# Patient Record
Sex: MALE | Race: WHITE | Employment: OTHER | URBAN - METROPOLITAN AREA
[De-identification: names, ages, dates, MRNs, and addresses within clinical notes are randomized per-mention and may not be internally consistent; named-entity substitution may affect disease eponyms.]

---

## 2017-02-14 RX ORDER — INSULIN ASPART 100 [IU]/ML
INJECTION, SOLUTION INTRAVENOUS; SUBCUTANEOUS
Qty: 30 ML | Refills: 0 | Status: ON HOLD | OUTPATIENT
Start: 2017-02-14 | End: 2017-08-20 | Stop reason: HOSPADM

## 2017-03-10 ENCOUNTER — OFFICE VISIT (OUTPATIENT)
Dept: INTERNAL MEDICINE CLINIC | Age: 62
End: 2017-03-10

## 2017-03-10 VITALS
SYSTOLIC BLOOD PRESSURE: 130 MMHG | BODY MASS INDEX: 19.7 KG/M2 | DIASTOLIC BLOOD PRESSURE: 80 MMHG | WEIGHT: 130 LBS | HEART RATE: 70 BPM | HEIGHT: 68 IN | RESPIRATION RATE: 14 BRPM

## 2017-03-10 DIAGNOSIS — Z79.4 TYPE 2 DIABETES MELLITUS WITH HYPERGLYCEMIA, WITH LONG-TERM CURRENT USE OF INSULIN (HCC): ICD-10-CM

## 2017-03-10 DIAGNOSIS — E11.65 TYPE 2 DIABETES MELLITUS WITH HYPERGLYCEMIA, WITH LONG-TERM CURRENT USE OF INSULIN (HCC): ICD-10-CM

## 2017-03-10 DIAGNOSIS — R80.9 MICROALBUMINURIA DUE TO TYPE 2 DIABETES MELLITUS (HCC): ICD-10-CM

## 2017-03-10 DIAGNOSIS — Z79.4 TYPE 2 DIABETES MELLITUS WITH HYPERGLYCEMIA, WITH LONG-TERM CURRENT USE OF INSULIN (HCC): Primary | ICD-10-CM

## 2017-03-10 DIAGNOSIS — Z79.4 TYPE 2 DIABETES MELLITUS WITH MICROALBUMINURIA, WITH LONG-TERM CURRENT USE OF INSULIN (HCC): ICD-10-CM

## 2017-03-10 DIAGNOSIS — E11.29 TYPE 2 DIABETES MELLITUS WITH MICROALBUMINURIA, WITH LONG-TERM CURRENT USE OF INSULIN (HCC): ICD-10-CM

## 2017-03-10 DIAGNOSIS — K90.9 UNSPECIFIED INTESTINAL MALABSORPTION: ICD-10-CM

## 2017-03-10 DIAGNOSIS — R80.9 TYPE 2 DIABETES MELLITUS WITH MICROALBUMINURIA, WITH LONG-TERM CURRENT USE OF INSULIN (HCC): ICD-10-CM

## 2017-03-10 DIAGNOSIS — E11.65 TYPE 2 DIABETES MELLITUS WITH HYPERGLYCEMIA, WITH LONG-TERM CURRENT USE OF INSULIN (HCC): Primary | ICD-10-CM

## 2017-03-10 DIAGNOSIS — Z55.0 ILLITERATE: ICD-10-CM

## 2017-03-10 DIAGNOSIS — E78.5 HYPERLIPIDEMIA WITH TARGET LDL LESS THAN 100: ICD-10-CM

## 2017-03-10 DIAGNOSIS — I10 ESSENTIAL HYPERTENSION: ICD-10-CM

## 2017-03-10 DIAGNOSIS — E11.29 MICROALBUMINURIA DUE TO TYPE 2 DIABETES MELLITUS (HCC): ICD-10-CM

## 2017-03-10 DIAGNOSIS — D50.0 IRON DEFICIENCY ANEMIA DUE TO CHRONIC BLOOD LOSS: ICD-10-CM

## 2017-03-10 LAB
A/G RATIO: 0.6 (ref 1.1–2.2)
ALBUMIN SERPL-MCNC: 3 G/DL (ref 3.4–5)
ALP BLD-CCNC: 135 U/L (ref 40–129)
ALT SERPL-CCNC: 14 U/L (ref 10–40)
ANION GAP SERPL CALCULATED.3IONS-SCNC: 16 MMOL/L (ref 3–16)
AST SERPL-CCNC: 19 U/L (ref 15–37)
BILIRUB SERPL-MCNC: <0.2 MG/DL (ref 0–1)
BUN BLDV-MCNC: 12 MG/DL (ref 7–20)
CALCIUM SERPL-MCNC: 9.7 MG/DL (ref 8.3–10.6)
CHLORIDE BLD-SCNC: 94 MMOL/L (ref 99–110)
CO2: 26 MMOL/L (ref 21–32)
CREAT SERPL-MCNC: 0.7 MG/DL (ref 0.8–1.3)
GFR AFRICAN AMERICAN: >60
GFR NON-AFRICAN AMERICAN: >60
GLOBULIN: 5 G/DL
GLUCOSE BLD-MCNC: 227 MG/DL (ref 70–99)
POTASSIUM SERPL-SCNC: 5.1 MMOL/L (ref 3.5–5.1)
SODIUM BLD-SCNC: 136 MMOL/L (ref 136–145)
TOTAL PROTEIN: 8 G/DL (ref 6.4–8.2)

## 2017-03-10 PROCEDURE — G8484 FLU IMMUNIZE NO ADMIN: HCPCS | Performed by: INTERNAL MEDICINE

## 2017-03-10 PROCEDURE — 3017F COLORECTAL CA SCREEN DOC REV: CPT | Performed by: INTERNAL MEDICINE

## 2017-03-10 PROCEDURE — G8420 CALC BMI NORM PARAMETERS: HCPCS | Performed by: INTERNAL MEDICINE

## 2017-03-10 PROCEDURE — 3046F HEMOGLOBIN A1C LEVEL >9.0%: CPT | Performed by: INTERNAL MEDICINE

## 2017-03-10 PROCEDURE — G8427 DOCREV CUR MEDS BY ELIG CLIN: HCPCS | Performed by: INTERNAL MEDICINE

## 2017-03-10 PROCEDURE — 99214 OFFICE O/P EST MOD 30 MIN: CPT | Performed by: INTERNAL MEDICINE

## 2017-03-10 PROCEDURE — 1036F TOBACCO NON-USER: CPT | Performed by: INTERNAL MEDICINE

## 2017-03-10 SDOH — EDUCATIONAL SECURITY - EDUCATION ATTAINMENT: ILITERACY AND LOW LEVEL LITERACY: Z55.0

## 2017-03-10 ASSESSMENT — ENCOUNTER SYMPTOMS
WHEEZING: 0
ABDOMINAL PAIN: 0
NAUSEA: 0
RHINORRHEA: 0
SHORTNESS OF BREATH: 0
VOMITING: 0
BACK PAIN: 0

## 2017-03-11 LAB
ESTIMATED AVERAGE GLUCOSE: 251.8 MG/DL
HBA1C MFR BLD: 10.4 %

## 2017-05-08 ENCOUNTER — TELEPHONE (OUTPATIENT)
Dept: FAMILY MEDICINE CLINIC | Age: 62
End: 2017-05-08

## 2017-06-03 PROBLEM — C64.2 RENAL CELL CARCINOMA OF LEFT KIDNEY (HCC): Status: ACTIVE | Noted: 2017-06-03

## 2017-06-07 ENCOUNTER — HOSPITAL ENCOUNTER (OUTPATIENT)
Dept: CT IMAGING | Age: 62
Discharge: HOME | End: 2017-06-07
Attending: UROLOGY | Admitting: UROLOGY

## 2017-06-07 DIAGNOSIS — N28.9 DISORDER OF KIDNEY AND URETER: ICD-10-CM

## 2017-06-07 DIAGNOSIS — N28.9 UNSPECIFIED DISORDER OF KIDNEY AND URETER: ICD-10-CM

## 2017-06-08 ENCOUNTER — OFFICE VISIT (OUTPATIENT)
Dept: INTERNAL MEDICINE CLINIC | Age: 62
End: 2017-06-08

## 2017-06-08 VITALS
DIASTOLIC BLOOD PRESSURE: 70 MMHG | RESPIRATION RATE: 14 BRPM | HEART RATE: 102 BPM | WEIGHT: 117 LBS | SYSTOLIC BLOOD PRESSURE: 126 MMHG | HEIGHT: 68 IN | BODY MASS INDEX: 17.73 KG/M2

## 2017-06-08 DIAGNOSIS — D50.9 IRON DEFICIENCY ANEMIA, UNSPECIFIED IRON DEFICIENCY ANEMIA TYPE: ICD-10-CM

## 2017-06-08 DIAGNOSIS — N28.89 LEFT RENAL MASS: Primary | ICD-10-CM

## 2017-06-08 PROCEDURE — 1036F TOBACCO NON-USER: CPT | Performed by: NURSE PRACTITIONER

## 2017-06-08 PROCEDURE — 99214 OFFICE O/P EST MOD 30 MIN: CPT | Performed by: NURSE PRACTITIONER

## 2017-06-08 PROCEDURE — 3017F COLORECTAL CA SCREEN DOC REV: CPT | Performed by: NURSE PRACTITIONER

## 2017-06-08 PROCEDURE — G8427 DOCREV CUR MEDS BY ELIG CLIN: HCPCS | Performed by: NURSE PRACTITIONER

## 2017-06-08 PROCEDURE — G8419 CALC BMI OUT NRM PARAM NOF/U: HCPCS | Performed by: NURSE PRACTITIONER

## 2017-06-08 ASSESSMENT — ENCOUNTER SYMPTOMS
ABDOMINAL PAIN: 0
NAUSEA: 0
DIARRHEA: 0
COUGH: 0
VOMITING: 0
BACK PAIN: 1
SHORTNESS OF BREATH: 0

## 2017-06-15 ENCOUNTER — HOSPITAL ENCOUNTER (OUTPATIENT)
Dept: OTHER | Age: 62
Discharge: HOME | End: 2017-06-15
Attending: UROLOGY | Admitting: UROLOGY

## 2017-06-15 PROBLEM — D63.8 ANEMIA OF CHRONIC DISEASE: Status: ACTIVE | Noted: 2017-06-15

## 2017-06-15 LAB
APTT: 35.7 SEC (ref 21–31.8)
INR BLD: 1.38 (ref 0.85–1.15)
PROTHROMBIN TIME: 15.6 SEC (ref 9.6–13)

## 2017-06-20 ENCOUNTER — HOSPITAL ENCOUNTER (OUTPATIENT)
Dept: GENERAL RADIOLOGY | Age: 62
Discharge: HOME | End: 2017-06-20
Attending: UROLOGY | Admitting: UROLOGY

## 2017-06-20 VITALS
OXYGEN SATURATION: 100 % | HEART RATE: 96 BPM | RESPIRATION RATE: 16 BRPM | SYSTOLIC BLOOD PRESSURE: 160 MMHG | DIASTOLIC BLOOD PRESSURE: 78 MMHG

## 2017-06-20 DIAGNOSIS — N28.89 RENAL MASS: ICD-10-CM

## 2017-06-20 DIAGNOSIS — D41.02 NEOPLASM OF UNCERTAIN BEHAVIOR OF LEFT KIDNEY: ICD-10-CM

## 2017-06-21 PROBLEM — N18.9 ANEMIA IN CHRONIC KIDNEY DISEASE (CKD): Status: ACTIVE | Noted: 2017-06-21

## 2017-06-21 PROBLEM — N18.30 CHRONIC KIDNEY DISEASE, STAGE III (MODERATE) (HCC): Status: ACTIVE | Noted: 2017-06-21

## 2017-06-21 PROBLEM — D63.1 ANEMIA IN CHRONIC KIDNEY DISEASE (CKD): Status: ACTIVE | Noted: 2017-06-21

## 2017-06-26 PROBLEM — E11.10 DIABETIC KETOACIDOSIS ASSOCIATED WITH TYPE 2 DIABETES MELLITUS (HCC): Status: ACTIVE | Noted: 2017-06-26

## 2017-06-26 PROBLEM — Z79.4 TYPE 2 DIABETES MELLITUS WITH HYPERGLYCEMIA, WITH LONG-TERM CURRENT USE OF INSULIN (HCC): Status: ACTIVE | Noted: 2017-06-26

## 2017-06-26 PROBLEM — E11.65 TYPE 2 DIABETES MELLITUS WITH HYPERGLYCEMIA, WITH LONG-TERM CURRENT USE OF INSULIN (HCC): Status: ACTIVE | Noted: 2017-06-26

## 2017-06-26 PROBLEM — D50.9 IRON DEFICIENCY ANEMIA: Status: ACTIVE | Noted: 2017-06-26

## 2017-06-26 PROBLEM — E86.0 DEHYDRATION: Status: ACTIVE | Noted: 2017-06-26

## 2017-06-26 PROBLEM — R63.4 ABNORMAL WEIGHT LOSS: Status: ACTIVE | Noted: 2017-06-26

## 2017-06-27 PROBLEM — D50.0 IRON DEFICIENCY ANEMIA DUE TO CHRONIC BLOOD LOSS: Status: ACTIVE | Noted: 2017-06-26

## 2017-06-27 PROBLEM — C64.9 RENAL CELL CARCINOMA (HCC): Status: ACTIVE | Noted: 2017-06-27

## 2017-07-06 PROBLEM — E46 PROTEIN CALORIE MALNUTRITION (HCC): Status: ACTIVE | Noted: 2017-07-06

## 2017-07-07 LAB
ABO/RH: NORMAL
ANTIBODY SCREEN: NORMAL

## 2017-07-10 ENCOUNTER — HOSPITAL ENCOUNTER (OUTPATIENT)
Dept: OTHER | Age: 62
Discharge: HOME | End: 2017-07-31
Attending: INTERNAL MEDICINE | Admitting: INTERNAL MEDICINE

## 2017-07-10 VITALS
HEART RATE: 74 BPM | HEIGHT: 68 IN | DIASTOLIC BLOOD PRESSURE: 65 MMHG | BODY MASS INDEX: 17.88 KG/M2 | WEIGHT: 118 LBS | TEMPERATURE: 97.6 F | SYSTOLIC BLOOD PRESSURE: 122 MMHG | RESPIRATION RATE: 16 BRPM

## 2017-07-10 LAB
BLOOD BANK DISPENSE STATUS: NORMAL
BLOOD BANK DISPENSE STATUS: NORMAL
BLOOD BANK PRODUCT CODE: NORMAL
BLOOD BANK PRODUCT CODE: NORMAL
BPU ID: NORMAL
BPU ID: NORMAL
DESCRIPTION BLOOD BANK: NORMAL
DESCRIPTION BLOOD BANK: NORMAL

## 2017-07-10 RX ORDER — 0.9 % SODIUM CHLORIDE 0.9 %
250 INTRAVENOUS SOLUTION INTRAVENOUS ONCE
Status: COMPLETED | OUTPATIENT
Start: 2017-07-10 | End: 2017-07-10

## 2017-07-10 RX ORDER — DIPHENHYDRAMINE HCL 25 MG
50 TABLET ORAL ONCE
Status: COMPLETED | OUTPATIENT
Start: 2017-07-10 | End: 2017-07-10

## 2017-07-10 RX ORDER — SODIUM CHLORIDE 0.9 % (FLUSH) 0.9 %
10 SYRINGE (ML) INJECTION PRN
Status: ACTIVE | OUTPATIENT
Start: 2017-07-10 | End: 2017-07-11

## 2017-07-10 RX ORDER — ACETAMINOPHEN 325 MG/1
650 TABLET ORAL ONCE
Status: COMPLETED | OUTPATIENT
Start: 2017-07-10 | End: 2017-07-10

## 2017-07-10 RX ADMIN — Medication 250 ML: at 07:54

## 2017-07-10 RX ADMIN — ACETAMINOPHEN 650 MG: 325 TABLET ORAL at 07:53

## 2017-07-10 RX ADMIN — Medication 10 ML: at 07:53

## 2017-07-10 RX ADMIN — Medication 25 MG: at 07:53

## 2017-07-10 ASSESSMENT — PAIN SCALES - GENERAL
PAINLEVEL_OUTOF10: 0
PAINLEVEL_OUTOF10: 0

## 2017-07-21 LAB
LV EF: 54 %
LVEF MODALITY: NORMAL

## 2017-07-27 ENCOUNTER — HOSPITAL ENCOUNTER (OUTPATIENT)
Dept: OTHER | Age: 62
Discharge: HOME | End: 2017-07-27
Attending: INTERNAL MEDICINE | Admitting: INTERNAL MEDICINE

## 2017-07-27 ENCOUNTER — HOSPITAL ENCOUNTER (OUTPATIENT)
Dept: NUCLEAR MEDICINE | Age: 62
Discharge: HOME | End: 2017-07-21
Attending: INTERNAL MEDICINE | Admitting: INTERNAL MEDICINE

## 2017-07-27 VITALS
SYSTOLIC BLOOD PRESSURE: 117 MMHG | DIASTOLIC BLOOD PRESSURE: 69 MMHG | TEMPERATURE: 97.8 F | HEART RATE: 76 BPM | RESPIRATION RATE: 16 BRPM

## 2017-07-27 DIAGNOSIS — Z79.4 TYPE 2 DIABETES MELLITUS WITHOUT COMPLICATION, WITH LONG-TERM CURRENT USE OF INSULIN (HCC): ICD-10-CM

## 2017-07-27 DIAGNOSIS — Z01.810 ENCOUNTER FOR PREPROCEDURAL CARDIOVASCULAR EXAMINATION: ICD-10-CM

## 2017-07-27 DIAGNOSIS — E11.9 TYPE 2 DIABETES MELLITUS WITHOUT COMPLICATION, WITH LONG-TERM CURRENT USE OF INSULIN (HCC): ICD-10-CM

## 2017-07-27 LAB
LV EF: 65 %
LVEF MODALITY: NORMAL

## 2017-07-27 ASSESSMENT — PAIN SCALES - GENERAL: PAINLEVEL_OUTOF10: 0

## 2017-07-28 RX ORDER — INSULIN GLARGINE 100 [IU]/ML
INJECTION, SOLUTION SUBCUTANEOUS
Qty: 20 ML | Refills: 3 | OUTPATIENT
Start: 2017-07-28

## 2017-07-28 RX ORDER — INSULIN ASPART 100 [IU]/ML
INJECTION, SOLUTION INTRAVENOUS; SUBCUTANEOUS
Qty: 15 ML | Refills: 3 | Status: ON HOLD | OUTPATIENT
Start: 2017-07-28 | End: 2017-09-08 | Stop reason: HOSPADM

## 2017-08-02 DIAGNOSIS — I10 ESSENTIAL HYPERTENSION: ICD-10-CM

## 2017-08-02 RX ORDER — LISINOPRIL 20 MG/1
TABLET ORAL
Qty: 30 TABLET | Refills: 5 | OUTPATIENT
Start: 2017-08-02

## 2017-08-04 ENCOUNTER — CARE COORDINATION (OUTPATIENT)
Dept: CARE COORDINATION | Age: 62
End: 2017-08-04

## 2017-08-07 ENCOUNTER — CARE COORDINATION (OUTPATIENT)
Dept: CARE COORDINATION | Age: 62
End: 2017-08-07

## 2017-08-17 PROBLEM — R63.4 ABNORMAL WEIGHT LOSS: Status: ACTIVE | Noted: 2017-08-17

## 2017-08-18 PROBLEM — M54.50 ACUTE LEFT-SIDED LOW BACK PAIN: Status: ACTIVE | Noted: 2017-08-18

## 2017-08-18 PROBLEM — R11.2 INTRACTABLE NAUSEA AND VOMITING: Status: ACTIVE | Noted: 2017-08-18

## 2017-08-18 PROBLEM — E11.65 TYPE 2 DIABETES MELLITUS WITH HYPERGLYCEMIA, WITH LONG-TERM CURRENT USE OF INSULIN (HCC): Status: ACTIVE | Noted: 2017-08-18

## 2017-08-18 PROBLEM — C64.2 RENAL CELL CARCINOMA OF LEFT KIDNEY (HCC): Status: ACTIVE | Noted: 2017-08-18

## 2017-08-18 PROBLEM — Z79.4 TYPE 2 DIABETES MELLITUS WITH HYPERGLYCEMIA, WITH LONG-TERM CURRENT USE OF INSULIN (HCC): Status: ACTIVE | Noted: 2017-08-18

## 2017-08-19 PROBLEM — E86.0 DEHYDRATION WITH HYPONATREMIA: Status: ACTIVE | Noted: 2017-08-19

## 2017-08-19 PROBLEM — E87.1 DEHYDRATION WITH HYPONATREMIA: Status: ACTIVE | Noted: 2017-08-19

## 2017-08-21 ENCOUNTER — TELEPHONE (OUTPATIENT)
Dept: PHARMACY | Facility: CLINIC | Age: 62
End: 2017-08-21

## 2017-08-21 ENCOUNTER — CARE COORDINATION (OUTPATIENT)
Dept: CASE MANAGEMENT | Age: 62
End: 2017-08-21

## 2017-08-25 ENCOUNTER — HOSPITAL ENCOUNTER (OUTPATIENT)
Dept: OTHER | Age: 62
Discharge: HOME | End: 2017-08-25
Attending: INTERNAL MEDICINE | Admitting: INTERNAL MEDICINE

## 2017-08-25 ENCOUNTER — CARE COORDINATION (OUTPATIENT)
Dept: CASE MANAGEMENT | Age: 62
End: 2017-08-25

## 2017-08-25 LAB
HCT VFR BLD CALC: 30.4 % (ref 40.5–52.5)
HEMOGLOBIN: 9.3 G/DL (ref 13.5–17.5)
MCH RBC QN AUTO: 22.9 PG (ref 26–34)
MCHC RBC AUTO-ENTMCNC: 30.7 G/DL (ref 31–36)
MCV RBC AUTO: 74.8 FL (ref 80–100)
PDW BLD-RTO: 19.2 % (ref 12.4–15.4)
PLATELET # BLD: 592 K/UL (ref 135–450)
PMV BLD AUTO: 7.8 FL (ref 5–10.5)
RBC # BLD: 4.07 M/UL (ref 4.2–5.9)
WBC # BLD: 11.9 K/UL (ref 4–11)

## 2017-08-30 ENCOUNTER — CARE COORDINATION (OUTPATIENT)
Dept: CASE MANAGEMENT | Age: 62
End: 2017-08-30

## 2017-09-06 PROBLEM — D50.9 MICROCYTIC HYPOCHROMIC ANEMIA: Status: ACTIVE | Noted: 2017-09-06

## 2017-09-06 PROBLEM — R73.9 HYPERGLYCEMIA WITHOUT KETOSIS: Status: ACTIVE | Noted: 2017-09-06

## 2017-09-06 PROBLEM — C64.2 RENAL CELL CARCINOMA OF LEFT KIDNEY (HCC): Status: ACTIVE | Noted: 2017-09-06

## 2017-09-06 PROBLEM — R11.2 NAUSEA AND VOMITING: Status: ACTIVE | Noted: 2017-09-06

## 2017-09-06 PROBLEM — E43 SEVERE PROTEIN-CALORIE MALNUTRITION (HCC): Status: ACTIVE | Noted: 2017-09-06

## 2017-09-06 PROBLEM — E40: Status: ACTIVE | Noted: 2017-09-06

## 2017-09-07 PROBLEM — E87.1 HYPONATREMIA: Status: ACTIVE | Noted: 2017-09-07

## 2017-09-11 ENCOUNTER — CARE COORDINATION (OUTPATIENT)
Dept: CARE COORDINATION | Age: 62
End: 2017-09-11

## 2017-09-12 ENCOUNTER — CARE COORDINATION (OUTPATIENT)
Dept: CASE MANAGEMENT | Age: 62
End: 2017-09-12

## 2017-09-12 DIAGNOSIS — I10 ESSENTIAL HYPERTENSION: ICD-10-CM

## 2017-09-12 RX ORDER — LISINOPRIL 20 MG/1
TABLET ORAL
Qty: 30 TABLET | Refills: 5 | OUTPATIENT
Start: 2017-09-12

## 2017-09-16 LAB
AVERAGE GLUCOSE: NORMAL
AVERAGE GLUCOSE: NORMAL
HBA1C MFR BLD: 5.8 %
HBA1C MFR BLD: 5.8 %

## 2017-09-25 ENCOUNTER — CARE COORDINATION (OUTPATIENT)
Dept: MEDSURG UNIT | Age: 62
End: 2017-09-25

## 2017-09-28 ENCOUNTER — CARE COORDINATION (OUTPATIENT)
Dept: CASE MANAGEMENT | Age: 62
End: 2017-09-28

## 2017-10-18 ENCOUNTER — HOSPITAL ENCOUNTER (OUTPATIENT)
Dept: CT IMAGING | Age: 62
Discharge: HOME | End: 2017-10-18
Attending: INTERNAL MEDICINE | Admitting: INTERNAL MEDICINE

## 2017-10-18 DIAGNOSIS — C64.2 MALIGNANT NEOPLASM OF LEFT KIDNEY, EXCEPT RENAL PELVIS (HCC): ICD-10-CM

## 2017-12-07 PROBLEM — E40: Status: RESOLVED | Noted: 2017-09-06 | Resolved: 2017-12-07

## 2017-12-15 PROBLEM — M54.50 ACUTE LEFT-SIDED LOW BACK PAIN: Status: RESOLVED | Noted: 2017-08-18 | Resolved: 2017-12-15

## 2018-01-02 RX ORDER — PEN NEEDLE, DIABETIC 31 GX5/16"
NEEDLE, DISPOSABLE MISCELLANEOUS
Qty: 100 EACH | Refills: 11 | Status: SHIPPED | OUTPATIENT
Start: 2018-01-02 | End: 2019-07-17

## 2018-02-01 ENCOUNTER — HOSPITAL ENCOUNTER (OUTPATIENT)
Dept: CT IMAGING | Age: 63
Discharge: HOME | End: 2018-02-01
Attending: INTERNAL MEDICINE | Admitting: INTERNAL MEDICINE

## 2018-02-01 DIAGNOSIS — R91.1 PULMONARY NODULE: ICD-10-CM

## 2018-02-01 DIAGNOSIS — C64.2 MALIGNANT NEOPLASM OF LEFT KIDNEY, EXCEPT RENAL PELVIS (HCC): ICD-10-CM

## 2018-02-27 ENCOUNTER — HOSPITAL ENCOUNTER (OUTPATIENT)
Dept: GENERAL RADIOLOGY | Age: 63
Discharge: HOME | End: 2018-02-27
Attending: INTERNAL MEDICINE | Admitting: INTERNAL MEDICINE

## 2018-02-27 VITALS
DIASTOLIC BLOOD PRESSURE: 87 MMHG | RESPIRATION RATE: 16 BRPM | HEIGHT: 68 IN | SYSTOLIC BLOOD PRESSURE: 120 MMHG | HEART RATE: 104 BPM | WEIGHT: 133 LBS | BODY MASS INDEX: 20.16 KG/M2 | OXYGEN SATURATION: 100 %

## 2018-02-27 DIAGNOSIS — R91.1 SOLITARY PULMONARY NODULE: ICD-10-CM

## 2018-02-27 DIAGNOSIS — R91.1 LUNG NODULE: ICD-10-CM

## 2018-02-27 PROBLEM — J93.9 PNEUMOTHORAX: Status: ACTIVE | Noted: 2018-02-27

## 2018-02-27 PROBLEM — Z85.528 H/O RENAL CELL CARCINOMA: Status: ACTIVE | Noted: 2018-02-27

## 2018-02-27 PROBLEM — C78.02 MALIGNANT NEOPLASM METASTATIC TO LEFT LUNG (HCC): Status: ACTIVE | Noted: 2018-02-27

## 2018-02-27 LAB
GLUCOSE BLD-MCNC: 152 MG/DL (ref 70–99)
INR BLD: 1.03 (ref 0.85–1.15)
PERFORMED ON: ABNORMAL
PLATELET # BLD: 389 K/UL (ref 135–450)
PROTHROMBIN TIME: 11.6 SEC (ref 9.6–13)

## 2018-02-27 RX ORDER — OXYCODONE HYDROCHLORIDE AND ACETAMINOPHEN 5; 325 MG/1; MG/1
1 TABLET ORAL EVERY 4 HOURS PRN
Status: DISCONTINUED | OUTPATIENT
Start: 2018-02-27 | End: 2018-02-27 | Stop reason: ALTCHOICE

## 2018-02-27 ASSESSMENT — PAIN SCALES - GENERAL
PAINLEVEL_OUTOF10: 0

## 2018-02-27 ASSESSMENT — PAIN - FUNCTIONAL ASSESSMENT: PAIN_FUNCTIONAL_ASSESSMENT: 0-10

## 2018-02-27 NOTE — PROGRESS NOTES
Transfer center call. Pt accepted by Dr. Tadeo May and pulmonology has been consulted. Pt admitted to [facility].

## 2018-02-27 NOTE — PROGRESS NOTES
Dr. Troy Mello review recent chest x-ray. Pneumothorax has increased in size. Pt updated on plan for chest tube insertion and admission to hospital. CT guided chest tube insertion procedure explained to the pt and brother including the risk and benefits. No questions at this time. Consent signed.

## 2018-02-28 PROBLEM — R91.8 LUNG NODULE, MULTIPLE: Status: ACTIVE | Noted: 2018-02-28

## 2018-02-28 PROBLEM — J95.811 POSTPROCEDURAL PNEUMOTHORAX: Status: ACTIVE | Noted: 2018-02-28

## 2018-03-01 ENCOUNTER — CARE COORDINATION (OUTPATIENT)
Dept: CASE MANAGEMENT | Age: 63
End: 2018-03-01

## 2018-03-03 NOTE — CARE COORDINATION
[facility] Transitions Initial Follow Up Call    Call within 2 business days of discharge: Yes    Patient:  Leotha Severance  Patient :  1955  MRN:  [id number]     Reason for Admission:  pneumothorax  Discharge Date:  18    RARS:  Estelaer  20.25    Final CTC attempt to reach Pt regarding recent hospital discharge. CTC unable to leave voice recording with call back number requesting a call back. CT calls ending. Follow up appointments:    No future appointments. TAL Garcia, RN  Care Transition Coordinator  Contact Number:  [phone]

## 2018-03-15 ENCOUNTER — TELEPHONE (OUTPATIENT)
Dept: PULMONOLOGY | Age: 63
End: 2018-03-15

## 2018-03-15 NOTE — TELEPHONE ENCOUNTER
Rec'd referral from Dr. Carlin White for renal cell carcinoma/bronch needed. Dr. Yo Caceres reviewed on Dr. Linda Beaver behalf. Per Dr. Yo Caceres, he spoke with Dr. Carlin White and Dr. Ashok Walsh, and best option for biopsy is through stomach. Advise pt of this and refer pt to Dr. Ashok Walsh. Spoke with pt and he was made aware. Pt verbalized understanding. Called Dr. Ashok Walsh office and was told to fax records and  will arrange with out pt dept and will contact pt directly to schedule. F/u to make sure pt gets scheduled with Dr. Ashok Walsh.

## 2018-03-21 ENCOUNTER — HOSPITAL ENCOUNTER (OUTPATIENT)
Dept: SURGERY | Age: 63
Discharge: HOME | End: 2018-03-21
Attending: INTERNAL MEDICINE | Admitting: INTERNAL MEDICINE

## 2018-03-21 VITALS
WEIGHT: 132 LBS | HEART RATE: 96 BPM | HEIGHT: 68 IN | TEMPERATURE: 97.5 F | BODY MASS INDEX: 20 KG/M2 | SYSTOLIC BLOOD PRESSURE: 157 MMHG | RESPIRATION RATE: 16 BRPM | OXYGEN SATURATION: 100 % | DIASTOLIC BLOOD PRESSURE: 87 MMHG

## 2018-03-21 LAB
GLUCOSE BLD-MCNC: 183 MG/DL (ref 70–99)
GLUCOSE BLD-MCNC: 203 MG/DL (ref 70–99)
PERFORMED ON: ABNORMAL
PERFORMED ON: ABNORMAL

## 2018-03-21 RX ORDER — SODIUM CHLORIDE 0.9 % (FLUSH) 0.9 %
10 SYRINGE (ML) INJECTION PRN
Status: DISCONTINUED | OUTPATIENT
Start: 2018-03-21 | End: 2018-03-22 | Stop reason: HOSPADM

## 2018-03-21 RX ORDER — SODIUM CHLORIDE, SODIUM LACTATE, POTASSIUM CHLORIDE, CALCIUM CHLORIDE 600; 310; 30; 20 MG/100ML; MG/100ML; MG/100ML; MG/100ML
INJECTION, SOLUTION INTRAVENOUS CONTINUOUS
Status: DISCONTINUED | OUTPATIENT
Start: 2018-03-21 | End: 2018-03-22 | Stop reason: HOSPADM

## 2018-03-21 RX ORDER — SODIUM CHLORIDE 0.9 % (FLUSH) 0.9 %
10 SYRINGE (ML) INJECTION EVERY 12 HOURS SCHEDULED
Status: DISCONTINUED | OUTPATIENT
Start: 2018-03-21 | End: 2018-03-22 | Stop reason: HOSPADM

## 2018-03-21 RX ORDER — LIDOCAINE HYDROCHLORIDE 10 MG/ML
1 INJECTION, SOLUTION INFILTRATION; PERINEURAL
Status: ACTIVE | OUTPATIENT
Start: 2018-03-21 | End: 2018-03-21

## 2018-03-21 RX ADMIN — SODIUM CHLORIDE, SODIUM LACTATE, POTASSIUM CHLORIDE, CALCIUM CHLORIDE: 600; 310; 30; 20 INJECTION, SOLUTION INTRAVENOUS at 08:47

## 2018-03-21 ASSESSMENT — PAIN SCALES - GENERAL: PAINLEVEL_OUTOF10: 0

## 2018-03-21 NOTE — PLAN OF CARE
ENDOSCOPY  PRE, INTRA, & POST PROCEDURAL  CARE PLAN    HEMODYNAMIC STATUS  INTERDISCIPLINARY   Goal: Hemodynamic Status to Baseline / Discharge Criteria Met  Interventions     1. Obtain Patient  Medical /  Surgical History     1 Assess & Review Allergies Prior to Endo & All  Meds (PRN)     2. Assess / Monitor Vital Signs / LOC (PRN). Intra Procedure VS/ LOC  Q5 Mins  & As Per Policy Until Discharge. 3. Obtain Baseline Cuco & Post Procedural  Cuco Per   Policy     4. Check Monitors, Alarms On     5. Patient Re Assessed by Physician     6. Monitor  I&O Post Procedure   NURSING   SAFETY & PSYCHO SOCIAL  INTERDISCIPLINARY   Goal: Patient Returns to Baseline Activity/ Meets Discharge Criteria  Interventions     1. Greet Patient with ID Badge/ Picture in View (PRN)     2. Be Available & Sensitive to Patients Needs (PRN)     3. Communicate referral to Pastoral Care as Appropriate (PRN)     4. Provide Age Specific Measures (PRN)     4. Admission Data Base Reviewed     5. Administer Meds Per Orders (PRN)     5. Maintain Baseline Activity  Or Activity as Ordered Per Physician     NUTRITION   INTERDISCIPLINARY   Goal: Patient to baseline/ Improved Nutrition  Interventions     1. Assess NPO Status / Notify Physician if Necessary (PRN)     2. NPO per Physician Orders     3. Assess Nutritional Status (PRN)     4. Assess Ability to Swallow as Indicated     LAB & DIAGNOSTICS   Goal: Additional Tests per Physicians   Orders  Interventions     1. Lab & Diagnostics per Physician Orders (PRN)     2.  Obtain  Urine / Serum HCG & FSBS On All Diabetic Patients  Per Policy & (PRN)     3. Assess Lab Time Out  for  Patient Safety as Needed (PRN)   RESPIRATORY  INTERDISCIPLINARY   Goal: Airway   Patency, SAO2   Saturation, Cough mechanism Maintained   Interventions     1. Evaluate Bilateral Breath Sounds  Baseline, (PRN), & Prior to Discharge     2. Weight & Height Noted ( PRN)     3.   Assess Baseline SPo2 (PRN) 4. Monitor   SAO2   Continuously During Sedation/ Analgesia & Until Patient Meets D/C Criteria. 5. Resuscitation Equipment Available (PRN)   GASTROINTESTINAL  INTERDISCIPLINARY   Goal: Bowel Sounds Maintained   Interventions     1. Evaluate Baseline Bowel Sounds, (PRN), & Prior to Discharge     2. Monitor  Abdominal status of Patient Throughout Procedure     KNOWLEDGE DEFICIT, EDUCATION, DISCHARGE PLAN   INTERDISCIPLINARY    Patient / SO verbalize Understanding Of Procedural discharge Instructions  Interventions     1. Obtain Informed Consent (PRN)      2. Initiate ENDO Plan of Care, Answer Questions (PRN)       3. Assess Patients Ability to Understand Information (PRN)     3. Discharge Planning ; Assure  Presence of   upon Patient Discharge     4. Education / Communication  Ongoing As Appropriate     5. Keep Families Aware of Delays As Appropriate     6. Reinforce Discharge Teaching / Post  Procedure  Instructions (PRN)    PAIN MANAGEMENT  INTERDISCIPLINARY   Goal:Patient Return to Pre Procedure Comfort  Interventions     1. Assess Baseline  Pain Level (PRN)     2. Intra Procedure ;  Evaluation & Assessment Of  Pain  is Ongoing     3. Post procedure; Assess Pain Level Once Awake/ Prior  To Discharge     2. Administer Analgesics as Ordered (PRN)      3. Assess Effectiveness of Pain Management (PRN)       Re-Assess Patient  after all Interventions. Assess Pain Level 30 - 60 Minutes After Pain Management Intervention.      4. Provide Discharge Teaching

## 2018-03-21 NOTE — ANESTHESIA PRE-OP
ANESTHESIA PRE-OPERATIVE EVALUATION    Patient Name: Isidoro Mariscal YOB: 1955   Sex:  Male Age: 57 yrs     Medical Record Number: [id number] Acct Number: [de-identified]   Room Number: Room/bed info not found Hospital Day: Hospital Day: 1     Date of Procedure: 03/22/18      Preoperative Diagnosis: ABDOMINAL MASS    Procedure: EGD with EUS    Surgeon:   Dr. Emily Condon    HPI: 68-year-old man with a past medical history of renal cancer diagnosed in June 2017 presents for evaluation of LUQ mass    Allergies: No Known Allergies     NPO: >MN    VS: BP: 144/80  Pulse: 108 Resp: 16  SpO2: 99 Temp: 97.7 °F (36.5 °C) Height: 5' 8\" (1.727 m)  (03/21/18)  Weight: 132 lb (59.9 kg)  (03/21/18) BMI: 20.1    Past Medical History   Cancer ([address]     L. kidney Dx June 2017    Diabetes mellitus ([address]     FH: skin cancer     Gall stone     Hyperlipidemia LDL goal < 100     Hypertension     Malignant malnutrition ([address] 9/6/2017    Microalbuminuria due to type 2 diabetes mellitus ([address] 10/12/2016    Type 2 diabetes mellitus with microalbuminuria, with long-term current use of insulin ([address] 3/10/2017     Active Problem List   Postprocedural pneumothorax 02/28/2018    Lung nodule, multiple 02/28/2018    Pneumothorax 02/27/2018    Malignant neoplasm metastatic to left lung ([address] 02/27/2018    H/O renal cell carcinoma 02/27/2018    Hyponatremia 09/07/2017    Severe protein-calorie malnutrition ([address] 09/06/2017    Nausea and vomiting 09/06/2017    Hyperglycemia without ketosis 09/06/2017    Renal cell carcinoma of left kidney ([address] 09/06/2017    Microcytic hypochromic anemia 09/06/2017    Dehydration with hyponatremia 08/19/2017    Intractable nausea and vomiting 08/18/2017    Type 2 diabetes mellitus with hyperglycemia, with long-term current use of insulin ([address] 08/18/2017    Abnormal weight loss 08/17/2017    Diabetic ketoacidosis associated with type 2 diabetes mellitus ([address] 06/26/2017   

## 2018-03-21 NOTE — H&P
History and Physical / Pre-Sedation Assessment    Patient:  Jie Alvarado   :   1955     Intended Procedure:  EUS    HPI: 53-year-old man with a past medical history of renal cancer diagnosed in 2017 presents for evaluation of LUQ mass    Nurses notes reviewed and agreed. Medications reviewed  Allergies: No Known Allergies    Physical Exam:  Vital Signs: BP (!) 144/80   Pulse 108   Temp 97.7 °F (36.5 °C) (Temporal)   Resp 16   Ht 5' 8\" (1.727 m)   Wt 132 lb (59.9 kg)   SpO2 99%   BMI 20.07 kg/m²    Airway: Mallampati: II (soft palate, uvula, fauces visible)  Pulmonary:Normal  Cardiac:Normal  Abdomen:Normal    Pre-Procedure Assessment / Plan:  ASA: Class 2 - A normal healthy patient with mild systemic disease  Level of Sedation Plan: Moderate sedation  Post Procedure plan: Return to same level of care    I assessed the patient and find that the patient is in satisfactory condition to proceed with the planned procedure and sedation plan. I have explained the risk, benefits, and alternatives to the procedure; the patient understands and agrees to proceed.        [place]  3/21/2018

## 2018-03-22 NOTE — OP NOTE
[address]                  [address] [facility], [place]                                 OPERATIVE REPORT    PATIENT NAME: Tabby North                :        1955  MED REC NO:   [id number]                          ROOM:  ACCOUNT NO:   [de-identified]                          ADMIT DATE: 2018  PROVIDER:     Ibrahima Sesay MD    DATE OF PROCEDURE:  2018    PREPROCEDURE DIAGNOSES:  1. History of renal cancer. 2.  Left upper quadrant mass. POSTPROCEDURE DIAGNOSES:  1.  A 1.5 x 2.2 cm round left upper quadrant mass. 2.  A 2.4 x 1.6 cm round left upper quadrant mass. 3.  Successful FNA of the lesions above mentioned. 4.  Otherwise, normal appearing pancreas. PROCEDURE:  Endoscopic ultrasound with FNA. SURGEON:  Ibrahima Sesay MD    PROCEDURE INDICATIONS:  This is a 58-year-old male with history of  hypertension, hyperlipidemia, diabetes, renal cell carcinoma, status post  nephrectomy in 2017, recently found to have intraabdominal left upper  quadrant masses. EUS was requested per FNA. He has not received any  chemotherapy or radiation. He denies any abdominal pain. He denies any  unintentional weight loss. MEDICATIONS:  MAC per Anesthesia. PROCEDURE DETAILS:  Informed consent obtained after discussing the risks,  benefits, and alternatives. Full history and physical was performed. The  patient was classified as ASA class III. Medications were sequentially  given to achieve adequate sedation. The cardiopulmonary status was  continuously monitored throughout the procedure. The patient was placed in  left lateral decubitus position. Once the patient was deemed to be  adequately sedated, a standard linear echoendoscope was inserted in the  mouth and advanced under direct visualization to the second portion of the  duodenum.   The entire mucosa of the esophagus, stomach, and duodenum were  examined carefully. Ultrasound images of the mediastinum in the AP window and subcarinal space  were performed. The scope was then advanced into the stomach, where  ultrasound images of the left lobe of the liver, aorta, celiac axis,  pancreas body, tail, pancreatic duct, splenic artery, splenic vein, left  adrenal gland, and spleen were visualized briefly. The scope was then  advanced to the duodenum, where ultrasound images of the pancreatic head,  uncinate process, common bile duct, pancreatic duct, portal vein, SMV were  all visualized. Ampulla was also visualized endoscopically and  sonographically. FINDINGS:  ESOPHAGUS:  The entire visualized esophagus appeared normal.  No evidence  of strictures, stenosis, or Guo's. Ultrasound images of the  mediastinum in the AP window and subcarinal space were negative for  lymphadenopathy. STOMACH:  The examined portion of the stomach appeared normal both on  forward and retroflex views. Ultrasound images of the left lobe of the  liver showed no evidence of mass lesions or intrahepatic ductal dilation. Aorta and celiac axis were free of any lymphadenopathy. Pancreas were  examined carefully. The pancreas appeared normal without any features of  chronic pancreatitis, mass lesions or cysts. Pancreas ducts measures 1.7  mm as it coursed normally towards the tail. Splenic artery, splenic vein  appeared normal.  Left kidney is absent. Left kidney and left side of the  adrenal gland were not visualized consistent with previous surgery. There  were two round mass lesions in the left upper quadrant measuring 1.5 x 2.2  and 2.4 x 1.6 cm. Both had round hypoechoic appearance consistent with  lymphadenopathy. A 22-gauge [employer] FNA needle was used to  sample these lesions, sent for cytology. The spleen appeared normal on  limited views. Three passes were sent for the cytology.     DUODENUM:  Examined portion of duodenum appeared normal.  Ultrasound images  of the pancreas head and uncinate process showed normal parenchyma without  any features of pancreas cystic lesions or mass lesions or chronic  pancreatitis, common bile duct measured 4.1 mm, pancreas duct measured 3.3  mm as they converged to the ampulla. Ampulla appeared normal, both  endoscopically and sonographically. The portal vein and SMV appeared  normal.    SUMMARY:  1. Two round hypoechoic 2.2 x 1.5, 2.4 x 1.6 cm lesions, status post FNA. 2.  Otherwise normal-appearance pancreas. 3.  Normal-appearing left lobe of the liver. 4.  Absent left adrenal gland and left kidney consistent with previous  surgery. 5.  The above mass lesions are likely malignant lymphadenopathy. RECOMMENDATIONS:  1. Discharge the patient to home when standard parameters are met. 2.  Await final cytology results. 3.  The patient to follow up with his oncologist for further management.         Kaycee Stephenson MD    D: 03/21/2018 9:58:02       T: 03/21/2018 15:05:19     [id number]  Job#: [id number]     Doc#: [id number]    CC:  MD Ana M Martin MD

## 2018-07-11 ENCOUNTER — HOSPITAL ENCOUNTER (OUTPATIENT)
Dept: CT IMAGING | Age: 63
Discharge: HOME | End: 2018-07-11
Attending: NURSE PRACTITIONER | Admitting: NURSE PRACTITIONER

## 2018-07-11 DIAGNOSIS — C78.01 SECONDARY MALIGNANT NEOPLASM OF RIGHT LUNG (HCC): ICD-10-CM

## 2018-07-11 DIAGNOSIS — C64.2 MALIGNANT NEOPLASM OF LEFT KIDNEY, EXCEPT RENAL PELVIS (HCC): ICD-10-CM

## 2018-10-10 ENCOUNTER — HOSPITAL ENCOUNTER (OUTPATIENT)
Dept: CT IMAGING | Age: 63
Discharge: HOME | End: 2018-10-10
Payer: MEDICARE

## 2018-10-10 ENCOUNTER — HOSPITAL ENCOUNTER (OUTPATIENT)
Age: 63
Discharge: HOME | End: 2018-10-10
Payer: MEDICARE

## 2018-10-10 DIAGNOSIS — C64.9 RENAL CELL CARCINOMA, UNSPECIFIED LATERALITY (HCC): ICD-10-CM

## 2018-10-10 LAB
A/G RATIO: 1.3 (ref 1.1–2.2)
ALBUMIN SERPL-MCNC: 4.3 G/DL (ref 3.4–5)
ALP BLD-CCNC: 98 U/L (ref 40–129)
ALT SERPL-CCNC: 24 U/L (ref 10–40)
ANION GAP SERPL CALCULATED.3IONS-SCNC: 15 MMOL/L (ref 3–16)
AST SERPL-CCNC: 44 U/L (ref 15–37)
BILIRUB SERPL-MCNC: 0.4 MG/DL (ref 0–1)
BUN BLDV-MCNC: 21 MG/DL (ref 7–20)
CALCIUM SERPL-MCNC: 9.8 MG/DL (ref 8.3–10.6)
CHLORIDE BLD-SCNC: 103 MMOL/L (ref 99–110)
CO2: 26 MMOL/L (ref 21–32)
CREAT SERPL-MCNC: 1.1 MG/DL (ref 0.8–1.3)
GFR AFRICAN AMERICAN: >60
GFR NON-AFRICAN AMERICAN: >60
GLOBULIN: 3.3 G/DL
GLUCOSE BLD-MCNC: 88 MG/DL (ref 70–99)
POTASSIUM SERPL-SCNC: 4.5 MMOL/L (ref 3.5–5.1)
SODIUM BLD-SCNC: 144 MMOL/L (ref 136–145)
TOTAL PROTEIN: 7.6 G/DL (ref 6.4–8.2)

## 2018-10-10 PROCEDURE — 71260 CT THORAX DX C+: CPT

## 2018-10-10 PROCEDURE — 74177 CT ABD & PELVIS W/CONTRAST: CPT

## 2018-10-10 PROCEDURE — 36415 COLL VENOUS BLD VENIPUNCTURE: CPT

## 2018-10-10 PROCEDURE — 80053 COMPREHEN METABOLIC PANEL: CPT

## 2018-10-10 PROCEDURE — 6360000004 HC RX CONTRAST MEDICATION: Performed by: NURSE PRACTITIONER

## 2018-10-10 RX ADMIN — IOHEXOL 50 ML: 240 INJECTION, SOLUTION INTRATHECAL; INTRAVASCULAR; INTRAVENOUS; ORAL at 10:27

## 2018-10-10 RX ADMIN — IOPAMIDOL 75 ML: 755 INJECTION, SOLUTION INTRAVENOUS at 10:28

## 2019-01-02 ENCOUNTER — HOSPITAL ENCOUNTER (OUTPATIENT)
Dept: CT IMAGING | Age: 64
Discharge: HOME | End: 2019-01-02
Payer: MEDICARE

## 2019-01-02 ENCOUNTER — HOSPITAL ENCOUNTER (OUTPATIENT)
Age: 64
Discharge: HOME | End: 2019-01-02
Payer: MEDICARE

## 2019-01-02 DIAGNOSIS — C64.9 RENAL CELL CARCINOMA, UNSPECIFIED LATERALITY (HCC): ICD-10-CM

## 2019-01-02 LAB
BUN BLDV-MCNC: 19 MG/DL (ref 7–20)
CREAT SERPL-MCNC: 1 MG/DL (ref 0.8–1.3)
GFR AFRICAN AMERICAN: >60
GFR NON-AFRICAN AMERICAN: >60

## 2019-01-02 PROCEDURE — 71260 CT THORAX DX C+: CPT

## 2019-01-02 PROCEDURE — 82565 ASSAY OF CREATININE: CPT

## 2019-01-02 PROCEDURE — 36415 COLL VENOUS BLD VENIPUNCTURE: CPT

## 2019-01-02 PROCEDURE — 6360000004 HC RX CONTRAST MEDICATION: Performed by: NURSE PRACTITIONER

## 2019-01-02 PROCEDURE — 84520 ASSAY OF UREA NITROGEN: CPT

## 2019-01-02 RX ADMIN — IOHEXOL 50 ML: 240 INJECTION, SOLUTION INTRATHECAL; INTRAVASCULAR; INTRAVENOUS; ORAL at 12:08

## 2019-01-02 RX ADMIN — IOPAMIDOL 75 ML: 755 INJECTION, SOLUTION INTRAVENOUS at 12:08

## 2019-01-10 ENCOUNTER — HOSPITAL ENCOUNTER (OUTPATIENT)
Dept: NURSING | Age: 64
Setting detail: INFUSION SERIES
Discharge: HOME | End: 2019-01-10
Payer: MEDICARE

## 2019-01-10 VITALS
HEART RATE: 76 BPM | HEIGHT: 68 IN | DIASTOLIC BLOOD PRESSURE: 59 MMHG | WEIGHT: 135 LBS | TEMPERATURE: 98.7 F | BODY MASS INDEX: 20.46 KG/M2 | OXYGEN SATURATION: 100 % | SYSTOLIC BLOOD PRESSURE: 134 MMHG | RESPIRATION RATE: 16 BRPM

## 2019-01-10 LAB
ABO/RH: NORMAL
ANTIBODY SCREEN: NORMAL
BLOOD BANK DISPENSE STATUS: NORMAL
BLOOD BANK DISPENSE STATUS: NORMAL
BLOOD BANK PRODUCT CODE: NORMAL
BLOOD BANK PRODUCT CODE: NORMAL
BPU ID: NORMAL
BPU ID: NORMAL
DESCRIPTION BLOOD BANK: NORMAL
DESCRIPTION BLOOD BANK: NORMAL
FERRITIN: 453.4 NG/ML (ref 30–400)
IRON SATURATION: 17 % (ref 20–50)
IRON: 45 UG/DL (ref 59–158)
TOTAL IRON BINDING CAPACITY: 263 UG/DL (ref 260–445)

## 2019-01-10 PROCEDURE — 82728 ASSAY OF FERRITIN: CPT

## 2019-01-10 PROCEDURE — 6370000000 HC RX 637 (ALT 250 FOR IP): Performed by: INTERNAL MEDICINE

## 2019-01-10 PROCEDURE — 86850 RBC ANTIBODY SCREEN: CPT

## 2019-01-10 PROCEDURE — 86923 COMPATIBILITY TEST ELECTRIC: CPT

## 2019-01-10 PROCEDURE — 83550 IRON BINDING TEST: CPT

## 2019-01-10 PROCEDURE — 86901 BLOOD TYPING SEROLOGIC RH(D): CPT

## 2019-01-10 PROCEDURE — 83540 ASSAY OF IRON: CPT

## 2019-01-10 PROCEDURE — 2580000003 HC RX 258: Performed by: INTERNAL MEDICINE

## 2019-01-10 PROCEDURE — 36430 TRANSFUSION BLD/BLD COMPNT: CPT

## 2019-01-10 PROCEDURE — 86900 BLOOD TYPING SEROLOGIC ABO: CPT

## 2019-01-10 PROCEDURE — P9016 RBC LEUKOCYTES REDUCED: HCPCS

## 2019-01-10 RX ORDER — SODIUM CHLORIDE 0.9 % (FLUSH) 0.9 %
10 SYRINGE (ML) INJECTION PRN
Status: DISCONTINUED | OUTPATIENT
Start: 2019-01-10 | End: 2019-01-11 | Stop reason: HOSPADM

## 2019-01-10 RX ORDER — DIPHENHYDRAMINE HCL 25 MG
25 TABLET ORAL ONCE
Status: COMPLETED | OUTPATIENT
Start: 2019-01-10 | End: 2019-01-10

## 2019-01-10 RX ORDER — ACETAMINOPHEN 325 MG/1
650 TABLET ORAL ONCE
Status: DISCONTINUED | OUTPATIENT
Start: 2019-01-10 | End: 2019-01-11 | Stop reason: HOSPADM

## 2019-01-10 RX ORDER — 0.9 % SODIUM CHLORIDE 0.9 %
250 INTRAVENOUS SOLUTION INTRAVENOUS ONCE
Status: COMPLETED | OUTPATIENT
Start: 2019-01-10 | End: 2019-01-10

## 2019-01-10 RX ADMIN — SODIUM CHLORIDE 250 ML: 9 INJECTION, SOLUTION INTRAVENOUS at 08:24

## 2019-01-10 RX ADMIN — DIPHENHYDRAMINE HCL 25 MG: 25 TABLET ORAL at 08:23

## 2019-01-10 RX ADMIN — Medication 10 ML: at 07:30

## 2019-01-10 ASSESSMENT — PAIN SCALES - GENERAL: PAINLEVEL_OUTOF10: 0

## 2019-01-10 NOTE — PROGRESS NOTES
Pt here ambulatory with family for a blood transfusion  Current H&H is 5.7 & 18.4  Pt has had blood transfusions before and denies any questions about it today   Blood consent obtained  IV # 20 started on 1st attempt to RFA per myself  Pt gary well  Blood for type and crossmatch was drawn without difficulty  Pt gary well  Pt and family informed that it may take 1 hr or so to get the blood ready  Both receptive  Pt watching TV  Blankets applied for comfort

## 2019-01-10 NOTE — PROGRESS NOTES
Blood tubing clear  IV removed  Cath tip intact  Pressure then pressure dressing applied and secured with coban dressing  IV site unremarkable  Pt gary well  Discharge instructions reviewed with pt and his family Copy given  Understanding verbalized then pt was discharged ambulatory in stable condition with his family

## 2019-01-10 NOTE — PROGRESS NOTES
Blood is ready  Pt reports that he tood tylenol 500 mg at home before he left this am  Will hold  Pre med of tylenol 650 mg for now  Benadryl 25 mg given  1st unit of PRBC's started  Pt gary well  Will stay with pt for 15 mins to monitor  Family remains at side

## 2019-01-10 NOTE — PROGRESS NOTES
Blood continues to infuse without any signs of allergic reactions  Pt without c/o's  IV site unremarkable  Family remains at side  Will continue to monitor

## 2019-01-10 NOTE — PROGRESS NOTES
2nd unit of PRBC's infused without any signs of adverse reactions  NS infusing at present time to clear tubing  Pt gary well  Family at side

## 2019-01-10 NOTE — PROGRESS NOTES
Blood infusing well  No signs of adverse reactions noted  Pt without c/o's  IV site unremarkable  Will continue to monitor  Pt is watching TV at present time  Family at side

## 2019-01-10 NOTE — PROGRESS NOTES
No changes noted  Pt has been napping in short intervals  Blood continues to infuse without any signs of adverse reactions  Will continue to monitor  Family at side

## 2019-01-10 NOTE — PROGRESS NOTES
1st unit of PRBC's infused without any signs of adverse reactions  2nd unit of PRBC's started  IV site unremarkable  Pt without c/o's  Will stay with pt and monitor for reactions

## 2019-01-10 NOTE — PROGRESS NOTES
Blood infusing well  No signs of reactions noted  Pt was up to BR  Gait is steady  Pt reports that he doesn't feel as weak as before  Will continue to monitor  Family at side

## 2019-02-09 ENCOUNTER — HOSPITAL ENCOUNTER (INPATIENT)
Age: 64
LOS: 2 days | Discharge: HOME | DRG: 381 | End: 2019-02-12
Attending: EMERGENCY MEDICINE | Admitting: INTERNAL MEDICINE
Payer: MEDICARE

## 2019-02-09 ENCOUNTER — APPOINTMENT (OUTPATIENT)
Dept: GENERAL RADIOLOGY | Age: 64
DRG: 381 | End: 2019-02-09
Payer: MEDICARE

## 2019-02-09 DIAGNOSIS — C64.2 RENAL CARCINOMA, LEFT (HCC): ICD-10-CM

## 2019-02-09 DIAGNOSIS — C79.9 METASTATIC CANCER (HCC): ICD-10-CM

## 2019-02-09 DIAGNOSIS — D50.0 IRON DEFICIENCY ANEMIA DUE TO CHRONIC BLOOD LOSS: ICD-10-CM

## 2019-02-09 DIAGNOSIS — K92.1 GASTROINTESTINAL HEMORRHAGE WITH MELENA: Primary | ICD-10-CM

## 2019-02-09 PROBLEM — D64.9 ANEMIA: Status: ACTIVE | Noted: 2019-02-09

## 2019-02-09 LAB
A/G RATIO: 1.3 (ref 1.1–2.2)
ABO/RH: NORMAL
ALBUMIN SERPL-MCNC: 3.5 G/DL (ref 3.4–5)
ALP BLD-CCNC: 63 U/L (ref 40–129)
ALT SERPL-CCNC: 16 U/L (ref 10–40)
ANION GAP SERPL CALCULATED.3IONS-SCNC: 14 MMOL/L (ref 3–16)
ANTIBODY SCREEN: NORMAL
APTT: 26.9 SEC (ref 26–36)
AST SERPL-CCNC: 33 U/L (ref 15–37)
BASOPHILS ABSOLUTE: 0 K/UL (ref 0–0.2)
BASOPHILS RELATIVE PERCENT: 0.3 %
BILIRUB SERPL-MCNC: <0.2 MG/DL (ref 0–1)
BLOOD BANK DISPENSE STATUS: NORMAL
BLOOD BANK PRODUCT CODE: NORMAL
BPU ID: NORMAL
BUN BLDV-MCNC: 27 MG/DL (ref 7–20)
CALCIUM SERPL-MCNC: 8.4 MG/DL (ref 8.3–10.6)
CHLORIDE BLD-SCNC: 100 MMOL/L (ref 99–110)
CO2: 22 MMOL/L (ref 21–32)
CREAT SERPL-MCNC: 1.3 MG/DL (ref 0.8–1.3)
DESCRIPTION BLOOD BANK: NORMAL
EOSINOPHILS ABSOLUTE: 0 K/UL (ref 0–0.6)
EOSINOPHILS RELATIVE PERCENT: 0.1 %
GFR AFRICAN AMERICAN: >60
GFR NON-AFRICAN AMERICAN: 55
GLOBULIN: 2.8 G/DL
GLUCOSE BLD-MCNC: 102 MG/DL (ref 70–99)
GLUCOSE BLD-MCNC: 144 MG/DL (ref 70–99)
GLUCOSE BLD-MCNC: 80 MG/DL (ref 70–99)
HCT VFR BLD CALC: 21.8 % (ref 40.5–52.5)
HCT VFR BLD CALC: 25.6 % (ref 40.5–52.5)
HEMOGLOBIN: 7.3 G/DL (ref 13.5–17.5)
HEMOGLOBIN: 8.6 G/DL (ref 13.5–17.5)
INR BLD: 1 (ref 0.86–1.14)
LYMPHOCYTES ABSOLUTE: 0.8 K/UL (ref 1–5.1)
LYMPHOCYTES RELATIVE PERCENT: 17.8 %
MCH RBC QN AUTO: 35.3 PG (ref 26–34)
MCHC RBC AUTO-ENTMCNC: 33.4 G/DL (ref 31–36)
MCV RBC AUTO: 105.8 FL (ref 80–100)
MONOCYTES ABSOLUTE: 0.3 K/UL (ref 0–1.3)
MONOCYTES RELATIVE PERCENT: 5.7 %
NEUTROPHILS ABSOLUTE: 3.5 K/UL (ref 1.7–7.7)
NEUTROPHILS RELATIVE PERCENT: 76.1 %
OCCULT BLOOD DIAGNOSTIC: ABNORMAL
PDW BLD-RTO: 22 % (ref 12.4–15.4)
PERFORMED ON: ABNORMAL
PERFORMED ON: NORMAL
PLATELET # BLD: 194 K/UL (ref 135–450)
PMV BLD AUTO: 7.4 FL (ref 5–10.5)
POTASSIUM SERPL-SCNC: 3.8 MMOL/L (ref 3.5–5.1)
PRO-BNP: 53 PG/ML (ref 0–124)
PROTHROMBIN TIME: 11.4 SEC (ref 9.8–13)
RBC # BLD: 2.06 M/UL (ref 4.2–5.9)
SODIUM BLD-SCNC: 136 MMOL/L (ref 136–145)
TOTAL PROTEIN: 6.3 G/DL (ref 6.4–8.2)
TROPONIN: 0.02 NG/ML
WBC # BLD: 4.6 K/UL (ref 4–11)

## 2019-02-09 PROCEDURE — G0328 FECAL BLOOD SCRN IMMUNOASSAY: HCPCS

## 2019-02-09 PROCEDURE — 71046 X-RAY EXAM CHEST 2 VIEWS: CPT

## 2019-02-09 PROCEDURE — C9113 INJ PANTOPRAZOLE SODIUM, VIA: HCPCS | Performed by: PHYSICIAN ASSISTANT

## 2019-02-09 PROCEDURE — 6360000002 HC RX W HCPCS: Performed by: INTERNAL MEDICINE

## 2019-02-09 PROCEDURE — 80053 COMPREHEN METABOLIC PANEL: CPT

## 2019-02-09 PROCEDURE — 85730 THROMBOPLASTIN TIME PARTIAL: CPT

## 2019-02-09 PROCEDURE — 96365 THER/PROPH/DIAG IV INF INIT: CPT

## 2019-02-09 PROCEDURE — P9016 RBC LEUKOCYTES REDUCED: HCPCS

## 2019-02-09 PROCEDURE — 36415 COLL VENOUS BLD VENIPUNCTURE: CPT

## 2019-02-09 PROCEDURE — 96367 TX/PROPH/DG ADDL SEQ IV INF: CPT

## 2019-02-09 PROCEDURE — 86923 COMPATIBILITY TEST ELECTRIC: CPT

## 2019-02-09 PROCEDURE — 85610 PROTHROMBIN TIME: CPT

## 2019-02-09 PROCEDURE — 2580000003 HC RX 258: Performed by: EMERGENCY MEDICINE

## 2019-02-09 PROCEDURE — 85014 HEMATOCRIT: CPT

## 2019-02-09 PROCEDURE — 86901 BLOOD TYPING SEROLOGIC RH(D): CPT

## 2019-02-09 PROCEDURE — 93005 ELECTROCARDIOGRAM TRACING: CPT | Performed by: PHYSICIAN ASSISTANT

## 2019-02-09 PROCEDURE — 0W3P8ZZ CONTROL BLEEDING IN GASTROINTESTINAL TRACT, VIA NATURAL OR ARTIFICIAL OPENING ENDOSCOPIC: ICD-10-PCS | Performed by: INTERNAL MEDICINE

## 2019-02-09 PROCEDURE — 2580000003 HC RX 258: Performed by: INTERNAL MEDICINE

## 2019-02-09 PROCEDURE — 85018 HEMOGLOBIN: CPT

## 2019-02-09 PROCEDURE — 3609013000 HC EGD TRANSORAL CONTROL BLEEDING ANY METHOD: Performed by: INTERNAL MEDICINE

## 2019-02-09 PROCEDURE — 3E0G8GC INTRODUCTION OF OTHER THERAPEUTIC SUBSTANCE INTO UPPER GI, VIA NATURAL OR ARTIFICIAL OPENING ENDOSCOPIC: ICD-10-PCS | Performed by: INTERNAL MEDICINE

## 2019-02-09 PROCEDURE — 36430 TRANSFUSION BLD/BLD COMPNT: CPT

## 2019-02-09 PROCEDURE — 6360000002 HC RX W HCPCS: Performed by: PHYSICIAN ASSISTANT

## 2019-02-09 PROCEDURE — G0378 HOSPITAL OBSERVATION PER HR: HCPCS

## 2019-02-09 PROCEDURE — 84484 ASSAY OF TROPONIN QUANT: CPT

## 2019-02-09 PROCEDURE — C9113 INJ PANTOPRAZOLE SODIUM, VIA: HCPCS | Performed by: INTERNAL MEDICINE

## 2019-02-09 PROCEDURE — 96376 TX/PRO/DX INJ SAME DRUG ADON: CPT

## 2019-02-09 PROCEDURE — 83880 ASSAY OF NATRIURETIC PEPTIDE: CPT

## 2019-02-09 PROCEDURE — 99285 EMERGENCY DEPT VISIT HI MDM: CPT

## 2019-02-09 PROCEDURE — 2580000003 HC RX 258: Performed by: PHYSICIAN ASSISTANT

## 2019-02-09 PROCEDURE — 85025 COMPLETE CBC W/AUTO DIFF WBC: CPT

## 2019-02-09 PROCEDURE — 6370000000 HC RX 637 (ALT 250 FOR IP): Performed by: INTERNAL MEDICINE

## 2019-02-09 PROCEDURE — 86850 RBC ANTIBODY SCREEN: CPT

## 2019-02-09 PROCEDURE — 86900 BLOOD TYPING SEROLOGIC ABO: CPT

## 2019-02-09 RX ORDER — PANTOPRAZOLE SODIUM 40 MG/10ML
40 INJECTION, POWDER, LYOPHILIZED, FOR SOLUTION INTRAVENOUS 2 TIMES DAILY
Status: DISCONTINUED | OUTPATIENT
Start: 2019-02-09 | End: 2019-02-10

## 2019-02-09 RX ORDER — SODIUM CHLORIDE 0.9 % (FLUSH) 0.9 %
10 SYRINGE (ML) INJECTION PRN
Status: DISCONTINUED | OUTPATIENT
Start: 2019-02-09 | End: 2019-02-12 | Stop reason: HOSPADM

## 2019-02-09 RX ORDER — ROSUVASTATIN CALCIUM 10 MG/1
5 TABLET, COATED ORAL NIGHTLY
Status: DISCONTINUED | OUTPATIENT
Start: 2019-02-09 | End: 2019-02-12 | Stop reason: HOSPADM

## 2019-02-09 RX ORDER — 0.9 % SODIUM CHLORIDE 0.9 %
250 INTRAVENOUS SOLUTION INTRAVENOUS ONCE
Status: COMPLETED | OUTPATIENT
Start: 2019-02-09 | End: 2019-02-09

## 2019-02-09 RX ORDER — SITAGLIPTIN 100 MG/1
100 TABLET, FILM COATED ORAL DAILY
COMMUNITY
Start: 2019-02-09

## 2019-02-09 RX ORDER — ONDANSETRON 2 MG/ML
4 INJECTION INTRAMUSCULAR; INTRAVENOUS EVERY 6 HOURS PRN
Status: DISCONTINUED | OUTPATIENT
Start: 2019-02-09 | End: 2019-02-12 | Stop reason: HOSPADM

## 2019-02-09 RX ORDER — SUNITINIB MALATE 50 MG/1
CAPSULE ORAL DAILY
COMMUNITY
End: 2019-07-17

## 2019-02-09 RX ORDER — SODIUM CHLORIDE 0.9 % (FLUSH) 0.9 %
10 SYRINGE (ML) INJECTION EVERY 12 HOURS
Status: DISCONTINUED | OUTPATIENT
Start: 2019-02-09 | End: 2019-02-12 | Stop reason: HOSPADM

## 2019-02-09 RX ORDER — SUNITINIB MALATE 50 MG/1
50 CAPSULE ORAL DAILY
Status: DISCONTINUED | OUTPATIENT
Start: 2019-02-09 | End: 2019-02-12

## 2019-02-09 RX ORDER — ACETAMINOPHEN 500 MG
500 TABLET ORAL 2 TIMES DAILY
Status: DISCONTINUED | OUTPATIENT
Start: 2019-02-09 | End: 2019-02-12 | Stop reason: HOSPADM

## 2019-02-09 RX ORDER — ROSUVASTATIN CALCIUM 5 MG/1
5 TABLET, COATED ORAL
COMMUNITY
Start: 2019-02-05 | End: 2019-07-17

## 2019-02-09 RX ORDER — SODIUM CHLORIDE 0.9 % (FLUSH) 0.9 %
10 SYRINGE (ML) INJECTION EVERY 12 HOURS SCHEDULED
Status: DISCONTINUED | OUTPATIENT
Start: 2019-02-09 | End: 2019-02-12 | Stop reason: HOSPADM

## 2019-02-09 RX ADMIN — ROSUVASTATIN CALCIUM 5 MG: 10 TABLET, FILM COATED ORAL at 20:28

## 2019-02-09 RX ADMIN — Medication 10 ML: at 20:29

## 2019-02-09 RX ADMIN — SODIUM CHLORIDE 8 MG/HR: 9 INJECTION, SOLUTION INTRAVENOUS at 14:30

## 2019-02-09 RX ADMIN — SODIUM CHLORIDE 80 MG: 9 INJECTION, SOLUTION INTRAVENOUS at 14:29

## 2019-02-09 RX ADMIN — ACETAMINOPHEN 500 MG: 500 TABLET ORAL at 20:28

## 2019-02-09 RX ADMIN — SUNITINIB MALATE 50 MG: 50 CAPSULE ORAL at 17:14

## 2019-02-09 RX ADMIN — SODIUM CHLORIDE 250 ML: 9 INJECTION, SOLUTION INTRAVENOUS at 17:29

## 2019-02-09 RX ADMIN — PANTOPRAZOLE SODIUM 40 MG: 40 INJECTION, POWDER, FOR SOLUTION INTRAVENOUS at 20:28

## 2019-02-09 ASSESSMENT — PAIN SCALES - GENERAL: PAINLEVEL_OUTOF10: 0

## 2019-02-10 PROBLEM — E11.9 TYPE II DIABETES MELLITUS, WELL CONTROLLED (HCC): Status: ACTIVE | Noted: 2017-08-18

## 2019-02-10 LAB
EKG ATRIAL RATE: 90 BPM
EKG DIAGNOSIS: NORMAL
EKG P AXIS: 73 DEGREES
EKG P-R INTERVAL: 162 MS
EKG Q-T INTERVAL: 360 MS
EKG QRS DURATION: 98 MS
EKG QTC CALCULATION (BAZETT): 440 MS
EKG R AXIS: 24 DEGREES
EKG T AXIS: 72 DEGREES
EKG VENTRICULAR RATE: 90 BPM
GLUCOSE BLD-MCNC: 116 MG/DL (ref 70–99)
GLUCOSE BLD-MCNC: 128 MG/DL (ref 70–99)
GLUCOSE BLD-MCNC: 136 MG/DL (ref 70–99)
GLUCOSE BLD-MCNC: 143 MG/DL (ref 70–99)
GLUCOSE BLD-MCNC: 97 MG/DL (ref 70–99)
HCT VFR BLD CALC: 26.6 % (ref 40.5–52.5)
HCT VFR BLD CALC: 27.5 % (ref 40.5–52.5)
HEMOGLOBIN: 8.8 G/DL (ref 13.5–17.5)
HEMOGLOBIN: 9.2 G/DL (ref 13.5–17.5)
PERFORMED ON: ABNORMAL
PERFORMED ON: NORMAL
TROPONIN: <0.01 NG/ML

## 2019-02-10 PROCEDURE — 1200000000 HC SEMI PRIVATE

## 2019-02-10 PROCEDURE — 7100000010 HC PHASE II RECOVERY - FIRST 15 MIN: Performed by: INTERNAL MEDICINE

## 2019-02-10 PROCEDURE — 2580000003 HC RX 258: Performed by: INTERNAL MEDICINE

## 2019-02-10 PROCEDURE — C9113 INJ PANTOPRAZOLE SODIUM, VIA: HCPCS | Performed by: INTERNAL MEDICINE

## 2019-02-10 PROCEDURE — 99152 MOD SED SAME PHYS/QHP 5/>YRS: CPT | Performed by: INTERNAL MEDICINE

## 2019-02-10 PROCEDURE — 3609013000 HC EGD TRANSORAL CONTROL BLEEDING ANY METHOD: Performed by: INTERNAL MEDICINE

## 2019-02-10 PROCEDURE — 3609012800 HC EGD DIAGNOSTIC ONLY

## 2019-02-10 PROCEDURE — 2720000010 HC SURG SUPPLY STERILE: Performed by: INTERNAL MEDICINE

## 2019-02-10 PROCEDURE — 36415 COLL VENOUS BLD VENIPUNCTURE: CPT

## 2019-02-10 PROCEDURE — 84484 ASSAY OF TROPONIN QUANT: CPT

## 2019-02-10 PROCEDURE — 6360000002 HC RX W HCPCS: Performed by: INTERNAL MEDICINE

## 2019-02-10 PROCEDURE — 99223 1ST HOSP IP/OBS HIGH 75: CPT | Performed by: PHYSICIAN ASSISTANT

## 2019-02-10 PROCEDURE — 85014 HEMATOCRIT: CPT

## 2019-02-10 PROCEDURE — 85018 HEMOGLOBIN: CPT

## 2019-02-10 PROCEDURE — G0378 HOSPITAL OBSERVATION PER HR: HCPCS

## 2019-02-10 PROCEDURE — 0DJ08ZZ INSPECTION OF UPPER INTESTINAL TRACT, VIA NATURAL OR ARTIFICIAL OPENING ENDOSCOPIC: ICD-10-PCS | Performed by: INTERNAL MEDICINE

## 2019-02-10 PROCEDURE — 7100000011 HC PHASE II RECOVERY - ADDTL 15 MIN: Performed by: INTERNAL MEDICINE

## 2019-02-10 PROCEDURE — 93010 ELECTROCARDIOGRAM REPORT: CPT | Performed by: INTERNAL MEDICINE

## 2019-02-10 RX ORDER — MIDAZOLAM HYDROCHLORIDE 5 MG/ML
INJECTION INTRAMUSCULAR; INTRAVENOUS PRN
Status: DISCONTINUED | OUTPATIENT
Start: 2019-02-10 | End: 2019-02-10 | Stop reason: ALTCHOICE

## 2019-02-10 RX ORDER — FENTANYL CITRATE 50 UG/ML
INJECTION, SOLUTION INTRAMUSCULAR; INTRAVENOUS PRN
Status: DISCONTINUED | OUTPATIENT
Start: 2019-02-10 | End: 2019-02-10 | Stop reason: ALTCHOICE

## 2019-02-10 RX ADMIN — SODIUM CHLORIDE 8 MG/HR: 9 INJECTION, SOLUTION INTRAVENOUS at 10:50

## 2019-02-10 RX ADMIN — Medication 10 ML: at 20:13

## 2019-02-10 RX ADMIN — SODIUM CHLORIDE 80 MG: 9 INJECTION, SOLUTION INTRAVENOUS at 10:30

## 2019-02-10 RX ADMIN — SODIUM CHLORIDE 8 MG/HR: 9 INJECTION, SOLUTION INTRAVENOUS at 20:13

## 2019-02-10 ASSESSMENT — PAIN SCALES - GENERAL
PAINLEVEL_OUTOF10: 0

## 2019-02-11 LAB
ANION GAP SERPL CALCULATED.3IONS-SCNC: 13 MMOL/L (ref 3–16)
BASOPHILS ABSOLUTE: 0 K/UL (ref 0–0.2)
BASOPHILS RELATIVE PERCENT: 0.3 %
BUN BLDV-MCNC: 13 MG/DL (ref 7–20)
CALCIUM SERPL-MCNC: 7.9 MG/DL (ref 8.3–10.6)
CHLORIDE BLD-SCNC: 102 MMOL/L (ref 99–110)
CO2: 21 MMOL/L (ref 21–32)
CREAT SERPL-MCNC: 0.9 MG/DL (ref 0.8–1.3)
EOSINOPHILS ABSOLUTE: 0.1 K/UL (ref 0–0.6)
EOSINOPHILS RELATIVE PERCENT: 2.2 %
GFR AFRICAN AMERICAN: >60
GFR NON-AFRICAN AMERICAN: >60
GLUCOSE BLD-MCNC: 105 MG/DL (ref 70–99)
GLUCOSE BLD-MCNC: 111 MG/DL (ref 70–99)
GLUCOSE BLD-MCNC: 118 MG/DL (ref 70–99)
GLUCOSE BLD-MCNC: 119 MG/DL (ref 70–99)
GLUCOSE BLD-MCNC: 233 MG/DL (ref 70–99)
HCT VFR BLD CALC: 24 % (ref 40.5–52.5)
HCT VFR BLD CALC: 26.4 % (ref 40.5–52.5)
HEMOGLOBIN: 8 G/DL (ref 13.5–17.5)
HEMOGLOBIN: 8.9 G/DL (ref 13.5–17.5)
LYMPHOCYTES ABSOLUTE: 1.3 K/UL (ref 1–5.1)
LYMPHOCYTES RELATIVE PERCENT: 29.4 %
MCH RBC QN AUTO: 35 PG (ref 26–34)
MCHC RBC AUTO-ENTMCNC: 33.6 G/DL (ref 31–36)
MCV RBC AUTO: 104.3 FL (ref 80–100)
MONOCYTES ABSOLUTE: 0.4 K/UL (ref 0–1.3)
MONOCYTES RELATIVE PERCENT: 7.8 %
NEUTROPHILS ABSOLUTE: 2.8 K/UL (ref 1.7–7.7)
NEUTROPHILS RELATIVE PERCENT: 60.3 %
PDW BLD-RTO: 20.3 % (ref 12.4–15.4)
PERFORMED ON: ABNORMAL
PLATELET # BLD: 116 K/UL (ref 135–450)
PLATELET SLIDE REVIEW: ABNORMAL
PMV BLD AUTO: 8 FL (ref 5–10.5)
POTASSIUM SERPL-SCNC: 3.9 MMOL/L (ref 3.5–5.1)
RBC # BLD: 2.53 M/UL (ref 4.2–5.9)
SLIDE REVIEW: ABNORMAL
SODIUM BLD-SCNC: 136 MMOL/L (ref 136–145)
WBC # BLD: 4.6 K/UL (ref 4–11)

## 2019-02-11 PROCEDURE — 2580000003 HC RX 258: Performed by: INTERNAL MEDICINE

## 2019-02-11 PROCEDURE — 36415 COLL VENOUS BLD VENIPUNCTURE: CPT

## 2019-02-11 PROCEDURE — C9113 INJ PANTOPRAZOLE SODIUM, VIA: HCPCS | Performed by: INTERNAL MEDICINE

## 2019-02-11 PROCEDURE — 85025 COMPLETE CBC W/AUTO DIFF WBC: CPT

## 2019-02-11 PROCEDURE — 99232 SBSQ HOSP IP/OBS MODERATE 35: CPT | Performed by: INTERNAL MEDICINE

## 2019-02-11 PROCEDURE — 85014 HEMATOCRIT: CPT

## 2019-02-11 PROCEDURE — 6370000000 HC RX 637 (ALT 250 FOR IP): Performed by: INTERNAL MEDICINE

## 2019-02-11 PROCEDURE — 6360000002 HC RX W HCPCS: Performed by: INTERNAL MEDICINE

## 2019-02-11 PROCEDURE — 85018 HEMOGLOBIN: CPT

## 2019-02-11 PROCEDURE — 1200000000 HC SEMI PRIVATE

## 2019-02-11 PROCEDURE — 80048 BASIC METABOLIC PNL TOTAL CA: CPT

## 2019-02-11 RX ADMIN — SODIUM CHLORIDE 8 MG/HR: 9 INJECTION, SOLUTION INTRAVENOUS at 20:55

## 2019-02-11 RX ADMIN — ROSUVASTATIN CALCIUM 5 MG: 10 TABLET, FILM COATED ORAL at 20:55

## 2019-02-11 RX ADMIN — SUNITINIB MALATE 50 MG: 50 CAPSULE ORAL at 18:04

## 2019-02-11 RX ADMIN — ACETAMINOPHEN 500 MG: 500 TABLET ORAL at 20:55

## 2019-02-11 RX ADMIN — Medication 10 ML: at 20:54

## 2019-02-11 RX ADMIN — SODIUM CHLORIDE 8 MG/HR: 9 INJECTION, SOLUTION INTRAVENOUS at 09:31

## 2019-02-11 ASSESSMENT — PAIN SCALES - GENERAL
PAINLEVEL_OUTOF10: 4
PAINLEVEL_OUTOF10: 1

## 2019-02-11 ASSESSMENT — PAIN DESCRIPTION - PAIN TYPE: TYPE: CHRONIC PAIN

## 2019-02-11 ASSESSMENT — PAIN DESCRIPTION - LOCATION: LOCATION: GENERALIZED

## 2019-02-12 VITALS
OXYGEN SATURATION: 99 % | TEMPERATURE: 97 F | RESPIRATION RATE: 16 BRPM | WEIGHT: 126 LBS | SYSTOLIC BLOOD PRESSURE: 125 MMHG | HEIGHT: 68 IN | HEART RATE: 78 BPM | BODY MASS INDEX: 19.1 KG/M2 | DIASTOLIC BLOOD PRESSURE: 71 MMHG

## 2019-02-12 LAB
GLUCOSE BLD-MCNC: 133 MG/DL (ref 70–99)
GLUCOSE BLD-MCNC: 298 MG/DL (ref 70–99)
GLUCOSE BLD-MCNC: 70 MG/DL (ref 70–99)
HCT VFR BLD CALC: 27.5 % (ref 40.5–52.5)
HEMOGLOBIN: 9.1 G/DL (ref 13.5–17.5)
PERFORMED ON: ABNORMAL
PERFORMED ON: ABNORMAL
PERFORMED ON: NORMAL

## 2019-02-12 PROCEDURE — 6370000000 HC RX 637 (ALT 250 FOR IP): Performed by: INTERNAL MEDICINE

## 2019-02-12 PROCEDURE — C9113 INJ PANTOPRAZOLE SODIUM, VIA: HCPCS | Performed by: INTERNAL MEDICINE

## 2019-02-12 PROCEDURE — 2580000003 HC RX 258: Performed by: INTERNAL MEDICINE

## 2019-02-12 PROCEDURE — 36415 COLL VENOUS BLD VENIPUNCTURE: CPT

## 2019-02-12 PROCEDURE — 85018 HEMOGLOBIN: CPT

## 2019-02-12 PROCEDURE — 85014 HEMATOCRIT: CPT

## 2019-02-12 PROCEDURE — 6360000002 HC RX W HCPCS: Performed by: INTERNAL MEDICINE

## 2019-02-12 PROCEDURE — 99238 HOSP IP/OBS DSCHRG MGMT 30/<: CPT | Performed by: INTERNAL MEDICINE

## 2019-02-12 RX ORDER — SUNITINIB MALATE 50 MG/1
50 CAPSULE ORAL DAILY
Status: DISCONTINUED | OUTPATIENT
Start: 2019-02-12 | End: 2019-02-12 | Stop reason: HOSPADM

## 2019-02-12 RX ORDER — PANTOPRAZOLE SODIUM 40 MG/1
40 TABLET, DELAYED RELEASE ORAL
Qty: 60 TABLET | Refills: 2 | Status: SHIPPED | OUTPATIENT
Start: 2019-02-12 | End: 2019-07-17

## 2019-02-12 RX ORDER — PANTOPRAZOLE SODIUM 40 MG/1
40 TABLET, DELAYED RELEASE ORAL
Status: DISCONTINUED | OUTPATIENT
Start: 2019-02-12 | End: 2019-02-12 | Stop reason: HOSPADM

## 2019-02-12 RX ADMIN — INSULIN LISPRO 6 UNITS: 100 INJECTION, SOLUTION INTRAVENOUS; SUBCUTANEOUS at 11:24

## 2019-02-12 RX ADMIN — PANTOPRAZOLE SODIUM 40 MG: 40 TABLET, DELAYED RELEASE ORAL at 15:48

## 2019-02-12 RX ADMIN — Medication 10 ML: at 08:29

## 2019-02-12 RX ADMIN — SODIUM CHLORIDE 8 MG/HR: 9 INJECTION, SOLUTION INTRAVENOUS at 06:53

## 2019-02-12 RX ADMIN — SUNITINIB MALATE 50 MG: 50 CAPSULE ORAL at 11:25

## 2019-02-12 ASSESSMENT — PAIN SCALES - GENERAL
PAINLEVEL_OUTOF10: 0
PAINLEVEL_OUTOF10: 0

## 2019-02-14 ENCOUNTER — HOSPITAL ENCOUNTER (EMERGENCY)
Age: 64
Discharge: HOME | End: 2019-02-14
Attending: EMERGENCY MEDICINE
Payer: MEDICARE

## 2019-02-14 VITALS
OXYGEN SATURATION: 99 % | DIASTOLIC BLOOD PRESSURE: 70 MMHG | WEIGHT: 126 LBS | BODY MASS INDEX: 19.1 KG/M2 | SYSTOLIC BLOOD PRESSURE: 140 MMHG | HEIGHT: 68 IN | HEART RATE: 79 BPM | TEMPERATURE: 96.7 F | RESPIRATION RATE: 14 BRPM

## 2019-02-14 DIAGNOSIS — R11.0 NAUSEA: Primary | ICD-10-CM

## 2019-02-14 DIAGNOSIS — R10.13 EPIGASTRIC PAIN: ICD-10-CM

## 2019-02-14 LAB
A/G RATIO: 1.2 (ref 1.1–2.2)
ABO/RH: NORMAL
ALBUMIN SERPL-MCNC: 3.6 G/DL (ref 3.4–5)
ALP BLD-CCNC: 73 U/L (ref 40–129)
ALT SERPL-CCNC: 18 U/L (ref 10–40)
ANION GAP SERPL CALCULATED.3IONS-SCNC: 12 MMOL/L (ref 3–16)
ANTIBODY SCREEN: NORMAL
APTT: 32.7 SEC (ref 26–36)
AST SERPL-CCNC: 34 U/L (ref 15–37)
BASOPHILS ABSOLUTE: 0 K/UL (ref 0–0.2)
BASOPHILS RELATIVE PERCENT: 0.2 %
BILIRUB SERPL-MCNC: 0.3 MG/DL (ref 0–1)
BUN BLDV-MCNC: 13 MG/DL (ref 7–20)
CALCIUM SERPL-MCNC: 8.6 MG/DL (ref 8.3–10.6)
CHLORIDE BLD-SCNC: 100 MMOL/L (ref 99–110)
CO2: 24 MMOL/L (ref 21–32)
CREAT SERPL-MCNC: 1 MG/DL (ref 0.8–1.3)
EOSINOPHILS ABSOLUTE: 0 K/UL (ref 0–0.6)
EOSINOPHILS RELATIVE PERCENT: 0.5 %
GFR AFRICAN AMERICAN: >60
GFR NON-AFRICAN AMERICAN: >60
GLOBULIN: 3 G/DL
GLUCOSE BLD-MCNC: 168 MG/DL (ref 70–99)
HCT VFR BLD CALC: 28.4 % (ref 40.5–52.5)
HEMOGLOBIN: 9.5 G/DL (ref 13.5–17.5)
INR BLD: 0.97 (ref 0.86–1.14)
LACTIC ACID: 1.8 MMOL/L (ref 0.4–2)
LIPASE: 27 U/L (ref 13–60)
LYMPHOCYTES ABSOLUTE: 0.9 K/UL (ref 1–5.1)
LYMPHOCYTES RELATIVE PERCENT: 26.6 %
MCH RBC QN AUTO: 34.7 PG (ref 26–34)
MCHC RBC AUTO-ENTMCNC: 33.5 G/DL (ref 31–36)
MCV RBC AUTO: 103.8 FL (ref 80–100)
MONOCYTES ABSOLUTE: 0.2 K/UL (ref 0–1.3)
MONOCYTES RELATIVE PERCENT: 4.6 %
NEUTROPHILS ABSOLUTE: 2.3 K/UL (ref 1.7–7.7)
NEUTROPHILS RELATIVE PERCENT: 68.1 %
PDW BLD-RTO: 21.8 % (ref 12.4–15.4)
PLATELET # BLD: 149 K/UL (ref 135–450)
PMV BLD AUTO: 7.4 FL (ref 5–10.5)
POTASSIUM SERPL-SCNC: 4.2 MMOL/L (ref 3.5–5.1)
PROTHROMBIN TIME: 11.1 SEC (ref 9.8–13)
RBC # BLD: 2.74 M/UL (ref 4.2–5.9)
SODIUM BLD-SCNC: 136 MMOL/L (ref 136–145)
TOTAL PROTEIN: 6.6 G/DL (ref 6.4–8.2)
WBC # BLD: 3.4 K/UL (ref 4–11)

## 2019-02-14 PROCEDURE — 85610 PROTHROMBIN TIME: CPT

## 2019-02-14 PROCEDURE — 83605 ASSAY OF LACTIC ACID: CPT

## 2019-02-14 PROCEDURE — 83690 ASSAY OF LIPASE: CPT

## 2019-02-14 PROCEDURE — C9113 INJ PANTOPRAZOLE SODIUM, VIA: HCPCS | Performed by: EMERGENCY MEDICINE

## 2019-02-14 PROCEDURE — 96375 TX/PRO/DX INJ NEW DRUG ADDON: CPT

## 2019-02-14 PROCEDURE — 86900 BLOOD TYPING SEROLOGIC ABO: CPT

## 2019-02-14 PROCEDURE — 99284 EMERGENCY DEPT VISIT MOD MDM: CPT

## 2019-02-14 PROCEDURE — 85025 COMPLETE CBC W/AUTO DIFF WBC: CPT

## 2019-02-14 PROCEDURE — 96374 THER/PROPH/DIAG INJ IV PUSH: CPT

## 2019-02-14 PROCEDURE — 6360000002 HC RX W HCPCS: Performed by: EMERGENCY MEDICINE

## 2019-02-14 PROCEDURE — 85730 THROMBOPLASTIN TIME PARTIAL: CPT

## 2019-02-14 PROCEDURE — 96361 HYDRATE IV INFUSION ADD-ON: CPT

## 2019-02-14 PROCEDURE — 86850 RBC ANTIBODY SCREEN: CPT

## 2019-02-14 PROCEDURE — 80053 COMPREHEN METABOLIC PANEL: CPT

## 2019-02-14 PROCEDURE — 2580000003 HC RX 258: Performed by: EMERGENCY MEDICINE

## 2019-02-14 PROCEDURE — 86901 BLOOD TYPING SEROLOGIC RH(D): CPT

## 2019-02-14 RX ORDER — MORPHINE SULFATE 4 MG/ML
2 INJECTION, SOLUTION INTRAMUSCULAR; INTRAVENOUS ONCE
Status: COMPLETED | OUTPATIENT
Start: 2019-02-14 | End: 2019-02-14

## 2019-02-14 RX ORDER — INSULIN GLARGINE 100 [IU]/ML
20 INJECTION, SOLUTION SUBCUTANEOUS 2 TIMES DAILY
COMMUNITY

## 2019-02-14 RX ORDER — 0.9 % SODIUM CHLORIDE 0.9 %
1000 INTRAVENOUS SOLUTION INTRAVENOUS ONCE
Status: COMPLETED | OUTPATIENT
Start: 2019-02-14 | End: 2019-02-14

## 2019-02-14 RX ORDER — METOCLOPRAMIDE HYDROCHLORIDE 5 MG/ML
5 INJECTION INTRAMUSCULAR; INTRAVENOUS ONCE
Status: COMPLETED | OUTPATIENT
Start: 2019-02-14 | End: 2019-02-14

## 2019-02-14 RX ORDER — PANTOPRAZOLE SODIUM 40 MG/10ML
40 INJECTION, POWDER, LYOPHILIZED, FOR SOLUTION INTRAVENOUS ONCE
Status: COMPLETED | OUTPATIENT
Start: 2019-02-14 | End: 2019-02-14

## 2019-02-14 RX ORDER — SUCRALFATE ORAL 1 G/10ML
1 SUSPENSION ORAL 4 TIMES DAILY
Qty: 1200 ML | Refills: 1 | Status: SHIPPED | OUTPATIENT
Start: 2019-02-14 | End: 2019-07-17

## 2019-02-14 RX ADMIN — PANTOPRAZOLE SODIUM 40 MG: 40 INJECTION, POWDER, FOR SOLUTION INTRAVENOUS at 01:38

## 2019-02-14 RX ADMIN — SODIUM CHLORIDE 1000 ML: 9 INJECTION, SOLUTION INTRAVENOUS at 01:38

## 2019-02-14 RX ADMIN — METOCLOPRAMIDE 5 MG: 5 INJECTION, SOLUTION INTRAMUSCULAR; INTRAVENOUS at 01:38

## 2019-02-14 RX ADMIN — MORPHINE SULFATE 2 MG: 4 INJECTION, SOLUTION INTRAMUSCULAR; INTRAVENOUS at 01:38

## 2019-02-14 ASSESSMENT — PAIN DESCRIPTION - LOCATION: LOCATION: ABDOMEN

## 2019-02-14 ASSESSMENT — PAIN SCALES - GENERAL
PAINLEVEL_OUTOF10: 5
PAINLEVEL_OUTOF10: 10

## 2019-02-20 ENCOUNTER — HOSPITAL ENCOUNTER (OUTPATIENT)
Age: 64
Discharge: HOME | End: 2019-02-20
Payer: MEDICARE

## 2019-02-20 LAB
ALBUMIN SERPL-MCNC: 3.6 G/DL (ref 3.4–5)
AMMONIA: 19 UMOL/L (ref 16–60)
ANION GAP SERPL CALCULATED.3IONS-SCNC: 16 MMOL/L (ref 3–16)
BUN BLDV-MCNC: 13 MG/DL (ref 7–20)
CALCIUM SERPL-MCNC: 8.7 MG/DL (ref 8.3–10.6)
CHLORIDE BLD-SCNC: 101 MMOL/L (ref 99–110)
CO2: 24 MMOL/L (ref 21–32)
CREAT SERPL-MCNC: 1 MG/DL (ref 0.8–1.3)
GFR AFRICAN AMERICAN: >60
GFR NON-AFRICAN AMERICAN: >60
GLUCOSE BLD-MCNC: 101 MG/DL (ref 70–99)
HCT VFR BLD CALC: 28.6 % (ref 40.5–52.5)
HEMOGLOBIN: 9.3 G/DL (ref 13.5–17.5)
MCH RBC QN AUTO: 34.7 PG (ref 26–34)
MCHC RBC AUTO-ENTMCNC: 32.4 G/DL (ref 31–36)
MCV RBC AUTO: 106.9 FL (ref 80–100)
PDW BLD-RTO: 22.4 % (ref 12.4–15.4)
PHOSPHORUS: 2.2 MG/DL (ref 2.5–4.9)
PLATELET # BLD: 174 K/UL (ref 135–450)
PMV BLD AUTO: 9 FL (ref 5–10.5)
POTASSIUM SERPL-SCNC: 4.4 MMOL/L (ref 3.5–5.1)
RBC # BLD: 2.68 M/UL (ref 4.2–5.9)
SODIUM BLD-SCNC: 141 MMOL/L (ref 136–145)
WBC # BLD: 3.1 K/UL (ref 4–11)

## 2019-02-20 PROCEDURE — 82140 ASSAY OF AMMONIA: CPT

## 2019-02-20 PROCEDURE — 36415 COLL VENOUS BLD VENIPUNCTURE: CPT

## 2019-02-20 PROCEDURE — 80069 RENAL FUNCTION PANEL: CPT

## 2019-02-20 PROCEDURE — 85027 COMPLETE CBC AUTOMATED: CPT

## 2019-03-01 ENCOUNTER — HOSPITAL ENCOUNTER (OUTPATIENT)
Dept: CT IMAGING | Age: 64
Discharge: HOME | End: 2019-03-01
Payer: MEDICARE

## 2019-03-01 DIAGNOSIS — C64.9 RENAL CELL CARCINOMA, UNSPECIFIED LATERALITY (HCC): ICD-10-CM

## 2019-03-01 PROCEDURE — 6360000004 HC RX CONTRAST MEDICATION: Performed by: INTERNAL MEDICINE

## 2019-03-01 PROCEDURE — 74177 CT ABD & PELVIS W/CONTRAST: CPT

## 2019-03-01 PROCEDURE — 71260 CT THORAX DX C+: CPT

## 2019-03-01 RX ADMIN — IOPAMIDOL 75 ML: 755 INJECTION, SOLUTION INTRAVENOUS at 10:00

## 2019-03-01 RX ADMIN — IOHEXOL 50 ML: 240 INJECTION, SOLUTION INTRATHECAL; INTRAVASCULAR; INTRAVENOUS; ORAL at 10:01

## 2019-03-21 ENCOUNTER — HOSPITAL ENCOUNTER (OUTPATIENT)
Dept: PET IMAGING | Age: 64
Discharge: HOME | End: 2019-03-21
Payer: MEDICARE

## 2019-03-21 DIAGNOSIS — C64.2 MALIGNANT TUMOR OF KIDNEY, LEFT (HCC): ICD-10-CM

## 2019-03-21 PROCEDURE — A9552 F18 FDG: HCPCS | Performed by: NURSE PRACTITIONER

## 2019-03-21 PROCEDURE — 3430000000 HC RX DIAGNOSTIC RADIOPHARMACEUTICAL: Performed by: NURSE PRACTITIONER

## 2019-03-21 PROCEDURE — 78815 PET IMAGE W/CT SKULL-THIGH: CPT

## 2019-03-21 RX ORDER — FLUDEOXYGLUCOSE F 18 200 MCI/ML
15.6 INJECTION, SOLUTION INTRAVENOUS
Status: COMPLETED | OUTPATIENT
Start: 2019-03-21 | End: 2019-03-21

## 2019-03-21 RX ADMIN — FLUDEOXYGLUCOSE F 18 15.6 MILLICURIE: 200 INJECTION, SOLUTION INTRAVENOUS at 08:56

## 2019-04-23 ENCOUNTER — HOSPITAL ENCOUNTER (EMERGENCY)
Age: 64
Discharge: HOME | End: 2019-04-23
Attending: EMERGENCY MEDICINE
Payer: MEDICARE

## 2019-04-23 ENCOUNTER — APPOINTMENT (OUTPATIENT)
Dept: GENERAL RADIOLOGY | Age: 64
End: 2019-04-23
Payer: MEDICARE

## 2019-04-23 VITALS
HEIGHT: 68 IN | WEIGHT: 138 LBS | BODY MASS INDEX: 20.92 KG/M2 | SYSTOLIC BLOOD PRESSURE: 125 MMHG | TEMPERATURE: 98.6 F | HEART RATE: 78 BPM | DIASTOLIC BLOOD PRESSURE: 64 MMHG | OXYGEN SATURATION: 100 % | RESPIRATION RATE: 14 BRPM

## 2019-04-23 DIAGNOSIS — M79.604 LEG PAIN, LATERAL, RIGHT: Primary | ICD-10-CM

## 2019-04-23 LAB
A/G RATIO: 1.2 (ref 1.1–2.2)
ALBUMIN SERPL-MCNC: 4.5 G/DL (ref 3.4–5)
ALP BLD-CCNC: 88 U/L (ref 40–129)
ALT SERPL-CCNC: 13 U/L (ref 10–40)
ANION GAP SERPL CALCULATED.3IONS-SCNC: 13 MMOL/L (ref 3–16)
AST SERPL-CCNC: 23 U/L (ref 15–37)
BASOPHILS ABSOLUTE: 0 K/UL (ref 0–0.2)
BASOPHILS RELATIVE PERCENT: 0.5 %
BILIRUB SERPL-MCNC: 0.3 MG/DL (ref 0–1)
BUN BLDV-MCNC: 26 MG/DL (ref 7–20)
CALCIUM SERPL-MCNC: 10.4 MG/DL (ref 8.3–10.6)
CHLORIDE BLD-SCNC: 97 MMOL/L (ref 99–110)
CO2: 28 MMOL/L (ref 21–32)
CREAT SERPL-MCNC: 1.2 MG/DL (ref 0.8–1.3)
EOSINOPHILS ABSOLUTE: 0 K/UL (ref 0–0.6)
EOSINOPHILS RELATIVE PERCENT: 0.3 %
GFR AFRICAN AMERICAN: >60
GFR NON-AFRICAN AMERICAN: >60
GLOBULIN: 3.8 G/DL
GLUCOSE BLD-MCNC: 250 MG/DL (ref 70–99)
HCT VFR BLD CALC: 30.4 % (ref 40.5–52.5)
HEMOGLOBIN: 10 G/DL (ref 13.5–17.5)
LYMPHOCYTES ABSOLUTE: 0.9 K/UL (ref 1–5.1)
LYMPHOCYTES RELATIVE PERCENT: 19.5 %
MCH RBC QN AUTO: 33.3 PG (ref 26–34)
MCHC RBC AUTO-ENTMCNC: 32.7 G/DL (ref 31–36)
MCV RBC AUTO: 101.7 FL (ref 80–100)
MONOCYTES ABSOLUTE: 0.6 K/UL (ref 0–1.3)
MONOCYTES RELATIVE PERCENT: 12 %
NEUTROPHILS ABSOLUTE: 3.3 K/UL (ref 1.7–7.7)
NEUTROPHILS RELATIVE PERCENT: 67.7 %
PDW BLD-RTO: 17.8 % (ref 12.4–15.4)
PLATELET # BLD: 501 K/UL (ref 135–450)
PMV BLD AUTO: 6.9 FL (ref 5–10.5)
POTASSIUM SERPL-SCNC: 4.2 MMOL/L (ref 3.5–5.1)
RBC # BLD: 2.99 M/UL (ref 4.2–5.9)
SODIUM BLD-SCNC: 138 MMOL/L (ref 136–145)
TOTAL PROTEIN: 8.3 G/DL (ref 6.4–8.2)
WBC # BLD: 4.9 K/UL (ref 4–11)

## 2019-04-23 PROCEDURE — 72170 X-RAY EXAM OF PELVIS: CPT

## 2019-04-23 PROCEDURE — 85025 COMPLETE CBC W/AUTO DIFF WBC: CPT

## 2019-04-23 PROCEDURE — 6360000002 HC RX W HCPCS: Performed by: EMERGENCY MEDICINE

## 2019-04-23 PROCEDURE — 96374 THER/PROPH/DIAG INJ IV PUSH: CPT

## 2019-04-23 PROCEDURE — 80053 COMPREHEN METABOLIC PANEL: CPT

## 2019-04-23 PROCEDURE — 73552 X-RAY EXAM OF FEMUR 2/>: CPT

## 2019-04-23 PROCEDURE — 36415 COLL VENOUS BLD VENIPUNCTURE: CPT

## 2019-04-23 PROCEDURE — 96375 TX/PRO/DX INJ NEW DRUG ADDON: CPT

## 2019-04-23 PROCEDURE — 99283 EMERGENCY DEPT VISIT LOW MDM: CPT

## 2019-04-23 RX ORDER — ONDANSETRON HYDROCHLORIDE 8 MG/1
8 TABLET, FILM COATED ORAL EVERY 8 HOURS PRN
Qty: 10 TABLET | Refills: 0 | Status: SHIPPED | OUTPATIENT
Start: 2019-04-23 | End: 2019-07-17

## 2019-04-23 RX ORDER — MORPHINE SULFATE 10 MG/ML
5 INJECTION, SOLUTION INTRAMUSCULAR; INTRAVENOUS ONCE
Status: COMPLETED | OUTPATIENT
Start: 2019-04-23 | End: 2019-04-23

## 2019-04-23 RX ORDER — ONDANSETRON 2 MG/ML
4 INJECTION INTRAMUSCULAR; INTRAVENOUS ONCE
Status: COMPLETED | OUTPATIENT
Start: 2019-04-23 | End: 2019-04-23

## 2019-04-23 RX ORDER — OXYCODONE HYDROCHLORIDE AND ACETAMINOPHEN 5; 325 MG/1; MG/1
1 TABLET ORAL EVERY 6 HOURS PRN
Qty: 10 TABLET | Refills: 0 | Status: SHIPPED | OUTPATIENT
Start: 2019-04-23 | End: 2019-04-26

## 2019-04-23 RX ADMIN — MORPHINE SULFATE 5 MG: 10 INJECTION INTRAVENOUS at 10:40

## 2019-04-23 RX ADMIN — ONDANSETRON 4 MG: 2 INJECTION INTRAMUSCULAR; INTRAVENOUS at 10:40

## 2019-04-23 ASSESSMENT — ENCOUNTER SYMPTOMS
SHORTNESS OF BREATH: 0
STRIDOR: 0
WHEEZING: 0

## 2019-04-23 ASSESSMENT — PAIN DESCRIPTION - LOCATION
LOCATION: LEG
LOCATION: LEG

## 2019-04-23 ASSESSMENT — PAIN DESCRIPTION - ORIENTATION
ORIENTATION: RIGHT;UPPER
ORIENTATION: RIGHT;UPPER

## 2019-04-23 ASSESSMENT — PAIN DESCRIPTION - PAIN TYPE
TYPE: ACUTE PAIN
TYPE: ACUTE PAIN

## 2019-04-23 ASSESSMENT — PAIN SCALES - GENERAL
PAINLEVEL_OUTOF10: 4
PAINLEVEL_OUTOF10: 10

## 2019-04-23 ASSESSMENT — PAIN DESCRIPTION - DESCRIPTORS
DESCRIPTORS: SHARP
DESCRIPTORS: ACHING;DISCOMFORT

## 2019-04-23 NOTE — ED PROVIDER NOTES
Patient presents emergency Department history of having renal cell carcinoma he is getting chemotherapy apparently his chemo agents was recently changed presents with deep right thigh pain, aching the Him awake all night denies erythema denies any trauma denies any bruising denies any acute injury's pain is diffuse throughout the right femur  He denies any other joint pain I did review his old records  Reviewing his old records did reveal that he does have metastatic disease specifically mini to the long. The history is provided by the patient and a relative. Leg Injury   Location:  Leg  Leg location:  R leg  Pain details:     Quality:  Shooting and throbbing    Radiates to:  Does not radiate    Severity:  Moderate    Onset quality:  Gradual    Timing:  Constant    Progression:  Worsening  Chronicity:  New  Dislocation: no    Foreign body present:  No foreign bodies  Associated symptoms: no fever        Review of Systems   Constitutional: Positive for activity change and appetite change. Negative for fever. HENT: Negative for congestion. Eyes: Negative for visual disturbance. Respiratory: Negative for shortness of breath, wheezing and stridor. Cardiovascular: Negative for chest pain, palpitations and leg swelling. Endocrine: Negative for polydipsia, polyphagia and polyuria. Genitourinary: Negative for flank pain. Musculoskeletal:        Patient has reproducible tenderness and discomfort in the right femur   Allergic/Immunologic: Negative for immunocompromised state. Neurological: Negative for seizures, speech difficulty and headaches. All other systems reviewed and are negative. Patient Vitals for the past 24 hrs:   BP Temp Temp src Pulse Resp SpO2 Height Weight   04/23/19 0905 125/64 98.6 °F (37 °C) Oral 80 14 100 % 5' 8\" (1.727 m) 138 lb (62.6 kg)       Physical Exam   Constitutional: He is oriented to person, place, and time. He appears well-developed and well-nourished. No distress. HENT:   Head: Normocephalic. Eyes: Pupils are equal, round, and reactive to light. Conjunctivae and EOM are normal.   Neck: Normal range of motion. Neck supple. No thyromegaly present. Cardiovascular: Normal rate, regular rhythm, normal heart sounds and intact distal pulses. Exam reveals no gallop and no friction rub. No murmur heard. Pulmonary/Chest: Effort normal and breath sounds normal. No respiratory distress. Abdominal: Soft. Bowel sounds are normal. He exhibits no distension. There is no tenderness. Musculoskeletal: He exhibits tenderness. Right upper leg: He exhibits tenderness and bony tenderness. He exhibits no swelling, no edema, no deformity and no laceration. Neurological: He is alert and oriented to person, place, and time. He displays normal reflexes. No cranial nerve deficit or sensory deficit. He exhibits normal muscle tone. Coordination normal. GCS eye subscore is 4. GCS verbal subscore is 5. GCS motor subscore is 6. Skin: No abrasion, no ecchymosis, no petechiae and no rash noted. Rash is not nodular. He is not diaphoretic. No erythema. Psychiatric: He has a normal mood and affect. His behavior is normal.   Nursing note and vitals reviewed. Procedures    MDM         Labs      Radiology      EKG Interpretation.      .    Past Medical History:   Diagnosis Date    Cancer ([address]     L. kidney Dx June 2017    Diabetes mellitus ([address]     FH: skin cancer     Gall stone     Gastric ulcer     Hyperlipidemia LDL goal < 100     Hypertension     Malignant malnutrition ([address] 9/6/2017    Microalbuminuria due to type 2 diabetes mellitus ([address] 10/12/2016    Type 2 diabetes mellitus with hyperglycemia, with long-term current use of insulin ([address] 10/12/2016    Type 2 diabetes mellitus with microalbuminuria, with long-term current use of insulin ([address] 3/10/2017       Past Surgical History:   Procedure Laterality Date    COLONOSCOPY  2010    screening-[facility]    COLONOSCOPY 2016    diverticulosis,transverse colon polyp    ENDOSCOPIC ULTRASOUND (UPPER)  2018    FNA LUQ mass    KIDNEY SURGERY      UPPER GASTROINTESTINAL ENDOSCOPY  02/10/2019    UPPER GASTROINTESTINAL ENDOSCOPY N/A 2/10/2019    EGD CONTROL HEMORRHAGE performed by Hakan Navarro MD at [facility]       Family History   Problem Relation Age of Onset    Cancer Father         colon, heart disease, stroke    Heart Disease Mother         stroke, diabetes    Diabetes Mother     Cancer Sister         colon, diabetes    Cancer Other         breast (sister)       Social History     Socioeconomic History    Marital status: Single     Spouse name: Not on file    Number of children: Not on file    Years of education: Not on file    Highest education level: Not on file   Occupational History    Not on file   Social Needs    Financial resource strain: Not on file    Food insecurity:     Worry: Not on file     Inability: Not on file    Transportation needs:     Medical: Not on file     Non-medical: Not on file   Tobacco Use    Smoking status: Former Smoker     Types: Pipe     Last attempt to quit: 2003     Years since quittin.3    Smokeless tobacco: Former User     Types: Chew     Quit date: 2011   Substance and Sexual Activity    Alcohol use: No    Drug use: No    Sexual activity: Never   Lifestyle    Physical activity:     Days per week: Not on file     Minutes per session: Not on file    Stress: Not on file   Relationships    Social connections:     Talks on phone: Not on file     Gets together: Not on file     Attends Judaism service: Not on file     Active member of club or organization: Not on file     Attends meetings of clubs or organizations: Not on file     Relationship status: Not on file    Intimate partner violence:     Fear of current or ex partner: Not on file     Emotionally abused: Not on file     Physically abused: Not on file     Forced sexual activity: SINGLE XRAY VIEW OF THE PELVIS 4/23/2019 9:49 am COMPARISON: None. HISTORY: ORDERING SYSTEM PROVIDED HISTORY: pain TECHNOLOGIST PROVIDED HISTORY: Reason for exam:->pain Ordering Physician Provided Reason for Exam: right thigh pain since starting new cancer medicine Acuity: Acute Type of Exam: Initial FINDINGS: No acute fracture or dislocation. No erosion. No significant joint space narrowing. Surgical clips left pelvis. No acute findings     Xr Femur Right (min 2 Views)    Result Date: 4/23/2019  EXAMINATION: 4 XRAY VIEWS OF THE RIGHT FEMUR 4/23/2019 9:49 am COMPARISON: None. HISTORY: ORDERING SYSTEM PROVIDED HISTORY: pain history of metastatic bone disease TECHNOLOGIST PROVIDED HISTORY: Reason for exam:->pain history of metastatic bone disease Ordering Physician Provided Reason for Exam: right thigh pain since starting new cancer medicine Acuity: Acute Type of Exam: Initial FINDINGS: No acute fracture or significant joint space narrowing at the right hip. Vascular calcifications. No erosion at the hip. No acute findings       New Prescriptions    ONDANSETRON (ZOFRAN) 8 MG TABLET    Take 1 tablet by mouth every 8 hours as needed for Nausea    OXYCODONE-ACETAMINOPHEN (PERCOCET) 5-325 MG PER TABLET    Take 1 tablet by mouth every 6 hours as needed for Pain for up to 10 doses. Gladys Tom MD  [address] [facility]  [phone]    In 3 days  As needed    Mary Jo Torres MD  [address]  [phone]    In 3 days        1.  Leg pain, lateral, right      Patient did not have any obvious lytic lesions in the femur  Lab work all looked okay so I did not come up with an obvious finding for the patient's femur pain there was no distal neurovascular deficits patient has a good sensation and peripheral pulses noted in the foot patient at this point at this point was advised on close follow-up for reevaluation in about 48 hours primary care

## 2019-05-07 ENCOUNTER — HOSPITAL ENCOUNTER (OUTPATIENT)
Age: 64
Discharge: HOME | End: 2019-05-07
Payer: MEDICARE

## 2019-05-07 LAB
A/G RATIO: 1.2 (ref 1.1–2.2)
ALBUMIN SERPL-MCNC: 4.3 G/DL (ref 3.4–5)
ALP BLD-CCNC: 92 U/L (ref 40–129)
ALT SERPL-CCNC: 13 U/L (ref 10–40)
ANION GAP SERPL CALCULATED.3IONS-SCNC: 11 MMOL/L (ref 3–16)
AST SERPL-CCNC: 26 U/L (ref 15–37)
BILIRUB SERPL-MCNC: <0.2 MG/DL (ref 0–1)
BUN BLDV-MCNC: 20 MG/DL (ref 7–20)
CALCIUM SERPL-MCNC: 10.1 MG/DL (ref 8.3–10.6)
CHLORIDE BLD-SCNC: 101 MMOL/L (ref 99–110)
CHOLESTEROL, TOTAL: 108 MG/DL (ref 0–199)
CO2: 28 MMOL/L (ref 21–32)
CREAT SERPL-MCNC: 1 MG/DL (ref 0.8–1.3)
GFR AFRICAN AMERICAN: >60
GFR NON-AFRICAN AMERICAN: >60
GLOBULIN: 3.6 G/DL
GLUCOSE BLD-MCNC: 200 MG/DL (ref 70–99)
HCT VFR BLD CALC: 31.2 % (ref 40.5–52.5)
HDLC SERPL-MCNC: 45 MG/DL (ref 40–60)
HEMOGLOBIN: 10 G/DL (ref 13.5–17.5)
LDL CHOLESTEROL CALCULATED: 46 MG/DL
MCH RBC QN AUTO: 31.4 PG (ref 26–34)
MCHC RBC AUTO-ENTMCNC: 32 G/DL (ref 31–36)
MCV RBC AUTO: 98.2 FL (ref 80–100)
PDW BLD-RTO: 17.6 % (ref 12.4–15.4)
PLATELET # BLD: 425 K/UL (ref 135–450)
PMV BLD AUTO: 7.8 FL (ref 5–10.5)
POTASSIUM SERPL-SCNC: 4.4 MMOL/L (ref 3.5–5.1)
RBC # BLD: 3.17 M/UL (ref 4.2–5.9)
SODIUM BLD-SCNC: 140 MMOL/L (ref 136–145)
TOTAL PROTEIN: 7.9 G/DL (ref 6.4–8.2)
TRIGL SERPL-MCNC: 83 MG/DL (ref 0–150)
VLDLC SERPL CALC-MCNC: 17 MG/DL
WBC # BLD: 5.7 K/UL (ref 4–11)

## 2019-05-07 PROCEDURE — 83036 HEMOGLOBIN GLYCOSYLATED A1C: CPT

## 2019-05-07 PROCEDURE — 80053 COMPREHEN METABOLIC PANEL: CPT

## 2019-05-07 PROCEDURE — 36415 COLL VENOUS BLD VENIPUNCTURE: CPT

## 2019-05-07 PROCEDURE — 85027 COMPLETE CBC AUTOMATED: CPT

## 2019-05-07 PROCEDURE — 80061 LIPID PANEL: CPT

## 2019-05-08 LAB
ESTIMATED AVERAGE GLUCOSE: 194.4 MG/DL
HBA1C MFR BLD: 8.4 %

## 2019-06-25 ENCOUNTER — HOSPITAL ENCOUNTER (OUTPATIENT)
Age: 64
Discharge: HOME | End: 2019-06-25
Payer: MEDICARE

## 2019-06-25 ENCOUNTER — HOSPITAL ENCOUNTER (OUTPATIENT)
Dept: CT IMAGING | Age: 64
Discharge: HOME | End: 2019-06-25
Payer: MEDICARE

## 2019-06-25 DIAGNOSIS — C64.2 RENAL CELL CARCINOMA, LEFT (HCC): ICD-10-CM

## 2019-06-25 LAB
BUN BLDV-MCNC: 16 MG/DL (ref 7–20)
CREAT SERPL-MCNC: 1 MG/DL (ref 0.8–1.3)
GFR AFRICAN AMERICAN: >60
GFR NON-AFRICAN AMERICAN: >60

## 2019-06-25 PROCEDURE — 74177 CT ABD & PELVIS W/CONTRAST: CPT

## 2019-06-25 PROCEDURE — 82565 ASSAY OF CREATININE: CPT

## 2019-06-25 PROCEDURE — 84520 ASSAY OF UREA NITROGEN: CPT

## 2019-06-25 PROCEDURE — 71260 CT THORAX DX C+: CPT

## 2019-06-25 PROCEDURE — 6360000004 HC RX CONTRAST MEDICATION: Performed by: INTERNAL MEDICINE

## 2019-06-25 PROCEDURE — 36415 COLL VENOUS BLD VENIPUNCTURE: CPT

## 2019-06-25 RX ADMIN — IOPAMIDOL 75 ML: 755 INJECTION, SOLUTION INTRAVENOUS at 10:35

## 2019-06-25 RX ADMIN — IOHEXOL 50 ML: 240 INJECTION, SOLUTION INTRATHECAL; INTRAVASCULAR; INTRAVENOUS; ORAL at 10:34

## 2019-07-17 ENCOUNTER — HOSPITAL ENCOUNTER (EMERGENCY)
Age: 64
Discharge: HOME | End: 2019-07-17
Attending: EMERGENCY MEDICINE
Payer: MEDICARE

## 2019-07-17 VITALS
DIASTOLIC BLOOD PRESSURE: 79 MMHG | HEIGHT: 68 IN | SYSTOLIC BLOOD PRESSURE: 142 MMHG | RESPIRATION RATE: 14 BRPM | OXYGEN SATURATION: 98 % | HEART RATE: 70 BPM | WEIGHT: 140 LBS | BODY MASS INDEX: 21.22 KG/M2 | TEMPERATURE: 98.1 F

## 2019-07-17 DIAGNOSIS — C64.9 RENAL CELL CARCINOMA, UNSPECIFIED LATERALITY (HCC): ICD-10-CM

## 2019-07-17 DIAGNOSIS — C79.51 BONE METASTASIS (HCC): ICD-10-CM

## 2019-07-17 DIAGNOSIS — M79.604 PAIN IN BOTH LOWER EXTREMITIES: Primary | ICD-10-CM

## 2019-07-17 DIAGNOSIS — M79.605 PAIN IN BOTH LOWER EXTREMITIES: Primary | ICD-10-CM

## 2019-07-17 DIAGNOSIS — E83.52 HYPERCALCEMIA: ICD-10-CM

## 2019-07-17 LAB
A/G RATIO: 1 (ref 1.1–2.2)
ALBUMIN SERPL-MCNC: 3.8 G/DL (ref 3.4–5)
ALP BLD-CCNC: 80 U/L (ref 40–129)
ALT SERPL-CCNC: 8 U/L (ref 10–40)
ANION GAP SERPL CALCULATED.3IONS-SCNC: 10 MMOL/L (ref 3–16)
AST SERPL-CCNC: 14 U/L (ref 15–37)
BASOPHILS ABSOLUTE: 0 K/UL (ref 0–0.2)
BASOPHILS RELATIVE PERCENT: 0.6 %
BILIRUB SERPL-MCNC: <0.2 MG/DL (ref 0–1)
BUN BLDV-MCNC: 21 MG/DL (ref 7–20)
CALCIUM SERPL-MCNC: 12.8 MG/DL (ref 8.3–10.6)
CHLORIDE BLD-SCNC: 103 MMOL/L (ref 99–110)
CO2: 27 MMOL/L (ref 21–32)
CREAT SERPL-MCNC: 1 MG/DL (ref 0.8–1.3)
EOSINOPHILS ABSOLUTE: 0.1 K/UL (ref 0–0.6)
EOSINOPHILS RELATIVE PERCENT: 1.1 %
GFR AFRICAN AMERICAN: >60
GFR NON-AFRICAN AMERICAN: >60
GLOBULIN: 3.7 G/DL
GLUCOSE BLD-MCNC: 187 MG/DL (ref 70–99)
HCT VFR BLD CALC: 34 % (ref 40.5–52.5)
HEMOGLOBIN: 10.6 G/DL (ref 13.5–17.5)
LYMPHOCYTES ABSOLUTE: 1.1 K/UL (ref 1–5.1)
LYMPHOCYTES RELATIVE PERCENT: 16 %
MCH RBC QN AUTO: 27.4 PG (ref 26–34)
MCHC RBC AUTO-ENTMCNC: 31.1 G/DL (ref 31–36)
MCV RBC AUTO: 88.1 FL (ref 80–100)
MONOCYTES ABSOLUTE: 0.6 K/UL (ref 0–1.3)
MONOCYTES RELATIVE PERCENT: 9.1 %
NEUTROPHILS ABSOLUTE: 5.1 K/UL (ref 1.7–7.7)
NEUTROPHILS RELATIVE PERCENT: 73.2 %
OCCULT BLOOD DIAGNOSTIC: NORMAL
PDW BLD-RTO: 17.3 % (ref 12.4–15.4)
PLATELET # BLD: 351 K/UL (ref 135–450)
PMV BLD AUTO: 8.2 FL (ref 5–10.5)
POTASSIUM SERPL-SCNC: 4.5 MMOL/L (ref 3.5–5.1)
RBC # BLD: 3.86 M/UL (ref 4.2–5.9)
SODIUM BLD-SCNC: 140 MMOL/L (ref 136–145)
TOTAL PROTEIN: 7.5 G/DL (ref 6.4–8.2)
WBC # BLD: 6.9 K/UL (ref 4–11)

## 2019-07-17 PROCEDURE — 96372 THER/PROPH/DIAG INJ SC/IM: CPT

## 2019-07-17 PROCEDURE — 36415 COLL VENOUS BLD VENIPUNCTURE: CPT

## 2019-07-17 PROCEDURE — 6360000002 HC RX W HCPCS: Performed by: EMERGENCY MEDICINE

## 2019-07-17 PROCEDURE — 99284 EMERGENCY DEPT VISIT MOD MDM: CPT

## 2019-07-17 PROCEDURE — 85025 COMPLETE CBC W/AUTO DIFF WBC: CPT

## 2019-07-17 PROCEDURE — 2580000003 HC RX 258: Performed by: EMERGENCY MEDICINE

## 2019-07-17 PROCEDURE — 96374 THER/PROPH/DIAG INJ IV PUSH: CPT

## 2019-07-17 PROCEDURE — 80053 COMPREHEN METABOLIC PANEL: CPT

## 2019-07-17 PROCEDURE — G0328 FECAL BLOOD SCRN IMMUNOASSAY: HCPCS

## 2019-07-17 RX ORDER — HYDROCODONE BITARTRATE AND ACETAMINOPHEN 5; 325 MG/1; MG/1
1 TABLET ORAL EVERY 6 HOURS PRN
COMMUNITY
End: 2019-07-31

## 2019-07-17 RX ORDER — 0.9 % SODIUM CHLORIDE 0.9 %
1000 INTRAVENOUS SOLUTION INTRAVENOUS ONCE
Status: COMPLETED | OUTPATIENT
Start: 2019-07-17 | End: 2019-07-17

## 2019-07-17 RX ORDER — FUROSEMIDE 10 MG/ML
40 INJECTION INTRAMUSCULAR; INTRAVENOUS ONCE
Status: COMPLETED | OUTPATIENT
Start: 2019-07-17 | End: 2019-07-17

## 2019-07-17 RX ORDER — SODIUM CHLORIDE 9 MG/ML
INJECTION, SOLUTION INTRAVENOUS CONTINUOUS
Status: DISCONTINUED | OUTPATIENT
Start: 2019-07-17 | End: 2019-07-17 | Stop reason: HOSPADM

## 2019-07-17 RX ADMIN — SODIUM CHLORIDE 1000 ML: 9 INJECTION, SOLUTION INTRAVENOUS at 09:31

## 2019-07-17 RX ADMIN — SODIUM CHLORIDE 1000 ML: 9 INJECTION, SOLUTION INTRAVENOUS at 08:30

## 2019-07-17 RX ADMIN — METHYLNALTREXONE BROMIDE 12 MG: 12 INJECTION, SOLUTION SUBCUTANEOUS at 09:05

## 2019-07-17 RX ADMIN — FUROSEMIDE 40 MG: 10 INJECTION, SOLUTION INTRAMUSCULAR; INTRAVENOUS at 09:31

## 2019-07-17 ASSESSMENT — PAIN DESCRIPTION - ORIENTATION: ORIENTATION: LEFT;RIGHT

## 2019-07-17 ASSESSMENT — PAIN SCALES - GENERAL: PAINLEVEL_OUTOF10: 8

## 2019-07-17 ASSESSMENT — ENCOUNTER SYMPTOMS
BLOOD IN STOOL: 1
SORE THROAT: 0
WHEEZING: 0
CHEST TIGHTNESS: 0
SINUS PRESSURE: 0
VOMITING: 0
SINUS PAIN: 0
DIARRHEA: 0
RECTAL PAIN: 0
NAUSEA: 0
SHORTNESS OF BREATH: 0
ABDOMINAL PAIN: 0
COUGH: 0
ABDOMINAL DISTENTION: 0
STRIDOR: 0
CONSTIPATION: 1

## 2019-07-17 ASSESSMENT — PAIN DESCRIPTION - DESCRIPTORS: DESCRIPTORS: SPASM

## 2019-07-17 ASSESSMENT — PAIN DESCRIPTION - LOCATION: LOCATION: LEG

## 2019-07-17 ASSESSMENT — PAIN DESCRIPTION - PAIN TYPE: TYPE: ACUTE PAIN

## 2019-07-17 NOTE — ED PROVIDER NOTES
of his numbers look pretty good  He did get relief after having an enema  At this point I did talk to Dr. John Carballo his oncologist who said that he would see him tomorrow or Friday  At this point he felt comfortable with his hypercalcemia going home and then he will also talk to him tomorrow or Friday about alternatives to different pain management  Labs      Radiology      EKG Interpretation.      Past Medical History:   Diagnosis Date    Cancer [facility])     L. kidney Dx June 2017    Diabetes mellitus ([address]     FH: skin cancer     Gall stone     Gastric ulcer     Hyperlipidemia LDL goal < 100     Hypertension     Malignant malnutrition ([address] 9/6/2017    Microalbuminuria due to type 2 diabetes mellitus ([address] 10/12/2016    Type 2 diabetes mellitus with hyperglycemia, with long-term current use of insulin ([address] 10/12/2016    Type 2 diabetes mellitus with microalbuminuria, with long-term current use of insulin ([address] 3/10/2017       Past Surgical History:   Procedure Laterality Date    COLON[facility]COPY  2010    screening[facility]    COLONOSCOPY  2016    diverticulosis,transverse colon polyp    ENDOSCOPIC ULTRASOUND (UPPER)  03/21/2018    [facility] LUQ mass    KIDNEY SURGERY      UPPER GASTROINTESTINAL ENDOSCOPY  02/10/2019    UPPER GASTROINTESTINAL ENDOSCOPY N/A 2/10/2019    EGD CONTROL HEMORRHAGE performed by Ekaterina Terrell MD at [address] [facility]       Family History   Problem Relation Age of Onset    Cancer Father         colon, heart disease, stroke    Heart Disease Mother         stroke, diabetes    Diabetes Mother     Cancer Sister         colon, diabetes    Cancer Other         breast (sister)       Social History     Socioeconomic History    Marital status: Single     Spouse name: Not on file    Number of children: Not on file    Years of education: Not on file    Highest education level: Not on file   Occupational History    Not on file   Social Needs    Financial resource strain: Not on HISTORY: ORDERING SYSTEM PROVIDED HISTORY: Renal cell carcinoma, left ([address] TECHNOLOGIST PROVIDED HISTORY: Additional Contrast?->Oral Ordering Physician Provided Reason for Exam: hx left kideny cancer, left kidney removed, only complaint is left leg pain Acuity: Acute Type of Exam: Initial Relevant Medical/Surgical History: back surgery for cancer, left kidney cancer FINDINGS: Chest: Mediastinum: Thyroid gland appears normal.  Abnormally enlarged hilar-mediastinal nodes are noted. Some areas of mediastinal adenopathy have increased compared to prior. AP window lymph node measures 1.7 cm x 1.4 cm, previously subcentimeter in size. Left hilar selina con[employer]erate measures 2.1 cm by 1.7 cm, slightly increased in size prior previously measuring 1.9 x 1.5 cm. . Selina con[employer]erate marginating the SVC has decreased in size, measuring 1.2 cm x 9 mm, previously 2.0 x 1.6 cm. Coronary artery calcification is seen. Small hiatal hernia seen. There is nonspecific thickening at the GE junction Lungs/pleura: Scattered pulmonary nodules are seen throughout the lungs. There are multiple new pleural-sub pleural implants seen anteriorly in the left chest.  The largest of which measures 1.9 cm x 9 mm. 8 mm subpleural nodule anteriorly in the left upper lobe appears similar. However there are new pleural implants seen inferiorly A punctate pulmonary nodule seen previously in the right upper lobe has increased in size, now measuring 7 mm, previously 2-3 mm. A tiny punctate nodule in the right middle lobe seen previously is also smaller. However there is a new nodule in the right lower lobe measuring 8 mm Soft Tissues/Bones: Spurring is seen in the spine Abdomen/Pelvis: Organs: Diaphragmatic hernia again seen on the left. Multiple soft tissue nodules are seen in the mesentery in the left upper quadrant in the herniated mesentery. Index nodule adjacent to the aorta measures 2.0 cm x 1.8 cm, similar to prior.  No intrahepatic ductal dilatation. Gallbladder appears normal. No peripancreatic inflammatory change. No hydronephrosis on the right. Hypodense focus in the right kidney appears similar prior measuring approximately 1.1 cm, likely cyst Left kidney is absent No peripancreatic inflammatory change There is new soft tissue nodularity seen in the left pericolic gutter, adjacent to the left psoas muscle GI/Bowel: Large stool load is seen in the rectum. No bowel obstruction Pelvis: Bladder is incompletely distended accentuating its wall thickness. Trace free fluid is seen in the pelvis. There is a new nodular density seen in the left perirectal region, measuring 1.6 cm x 2.7 cm. Peritoneum/Retroperitoneum: Atherosclerotic change seen in aorta. Bones/Soft Tissues: Osseous metastatic disease is redemonstrated. There is increased compression deformity at the L3 level, with focal metastasis with a soft tissue component. Size of the metastasis measures 3.6 cm anterior to posterior, previously 2.3 cm. There is associated nerve root compression. Within the chest there increase in size and number of pleural implants. A few pulmonary nodules also appear larger, with a few pulmonary nodules appearing smaller. Mediastinal adenopathy also persists within the chest with areas of increased adenopathy however russ conglomerate near the SVC has decreased in size Within the abdomen and pelvis there are new soft tissue implants-developing adenopathy in the left upper quadrant, left pericolic gutter, and in the pelvis on the left. There is osseous metastatic disease with increased size of soft tissue metastasis involving the L2 vertebral body with increased central canal narrowing       New Prescriptions    No medications on file       Pat Moyer MD  [facility]  [address] [address]    Schedule an appointment as soon as possible for a visit in 2 days        1. Pain in both lower extremities    2.  Renal cell

## 2019-07-31 ENCOUNTER — APPOINTMENT (OUTPATIENT)
Dept: GENERAL RADIOLOGY | Age: 64
DRG: 392 | End: 2019-07-31
Payer: MEDICARE

## 2019-07-31 ENCOUNTER — APPOINTMENT (OUTPATIENT)
Dept: CT IMAGING | Age: 64
DRG: 392 | End: 2019-07-31
Payer: MEDICARE

## 2019-07-31 ENCOUNTER — HOSPITAL ENCOUNTER (INPATIENT)
Age: 64
LOS: 10 days | Discharge: ANOTHER HOSPITAL | DRG: 392 | End: 2019-08-10
Attending: EMERGENCY MEDICINE | Admitting: INTERNAL MEDICINE
Payer: MEDICARE

## 2019-07-31 DIAGNOSIS — K56.609 SBO (SMALL BOWEL OBSTRUCTION) (HCC): ICD-10-CM

## 2019-07-31 DIAGNOSIS — Z85.528 HISTORY OF RENAL CELL CANCER: ICD-10-CM

## 2019-07-31 DIAGNOSIS — Z90.5 S/P NEPHRECTOMY: ICD-10-CM

## 2019-07-31 DIAGNOSIS — C34.90 LUNG CANCER METASTATIC TO BONE (HCC): ICD-10-CM

## 2019-07-31 DIAGNOSIS — C79.51 LUNG CANCER METASTATIC TO BONE (HCC): ICD-10-CM

## 2019-07-31 DIAGNOSIS — N17.9 AKI (ACUTE KIDNEY INJURY) (HCC): Primary | ICD-10-CM

## 2019-07-31 DIAGNOSIS — S32.029A CLOSED FRACTURE OF SECOND LUMBAR VERTEBRA, UNSPECIFIED FRACTURE MORPHOLOGY, INITIAL ENCOUNTER (HCC): ICD-10-CM

## 2019-07-31 LAB
A/G RATIO: 0.8 (ref 1.1–2.2)
ALBUMIN SERPL-MCNC: 3.7 G/DL (ref 3.4–5)
ALP BLD-CCNC: 91 U/L (ref 40–129)
ALT SERPL-CCNC: 7 U/L (ref 10–40)
ANION GAP SERPL CALCULATED.3IONS-SCNC: 19 MMOL/L (ref 3–16)
ANION GAP SERPL CALCULATED.3IONS-SCNC: 23 MMOL/L (ref 3–16)
AST SERPL-CCNC: 11 U/L (ref 15–37)
BASOPHILS ABSOLUTE: 0 K/UL (ref 0–0.2)
BASOPHILS RELATIVE PERCENT: 0.4 %
BILIRUB SERPL-MCNC: 0.4 MG/DL (ref 0–1)
BUN BLDV-MCNC: 52 MG/DL (ref 7–20)
BUN BLDV-MCNC: 57 MG/DL (ref 7–20)
CALCIUM SERPL-MCNC: 9.1 MG/DL (ref 8.3–10.6)
CALCIUM SERPL-MCNC: 9.9 MG/DL (ref 8.3–10.6)
CHLORIDE BLD-SCNC: 85 MMOL/L (ref 99–110)
CHLORIDE BLD-SCNC: 89 MMOL/L (ref 99–110)
CO2: 20 MMOL/L (ref 21–32)
CO2: 23 MMOL/L (ref 21–32)
CREAT SERPL-MCNC: 4.4 MG/DL (ref 0.8–1.3)
CREAT SERPL-MCNC: 5.6 MG/DL (ref 0.8–1.3)
EOSINOPHILS ABSOLUTE: 0 K/UL (ref 0–0.6)
EOSINOPHILS RELATIVE PERCENT: 0.2 %
GFR AFRICAN AMERICAN: 12
GFR AFRICAN AMERICAN: 16
GFR NON-AFRICAN AMERICAN: 10
GFR NON-AFRICAN AMERICAN: 14
GLOBULIN: 4.6 G/DL
GLUCOSE BLD-MCNC: 175 MG/DL (ref 70–99)
GLUCOSE BLD-MCNC: 223 MG/DL (ref 70–99)
HCT VFR BLD CALC: 36.2 % (ref 40.5–52.5)
HEMOGLOBIN: 11.3 G/DL (ref 13.5–17.5)
LACTIC ACID: 2.9 MMOL/L (ref 0.4–2)
LYMPHOCYTES ABSOLUTE: 0.8 K/UL (ref 1–5.1)
LYMPHOCYTES RELATIVE PERCENT: 11 %
MCH RBC QN AUTO: 26.7 PG (ref 26–34)
MCHC RBC AUTO-ENTMCNC: 31.2 G/DL (ref 31–36)
MCV RBC AUTO: 85.4 FL (ref 80–100)
MONOCYTES ABSOLUTE: 0.7 K/UL (ref 0–1.3)
MONOCYTES RELATIVE PERCENT: 10.5 %
NEUTROPHILS ABSOLUTE: 5.4 K/UL (ref 1.7–7.7)
NEUTROPHILS RELATIVE PERCENT: 77.9 %
PDW BLD-RTO: 17.8 % (ref 12.4–15.4)
PLATELET # BLD: 411 K/UL (ref 135–450)
PMV BLD AUTO: 7.5 FL (ref 5–10.5)
POTASSIUM SERPL-SCNC: 4.2 MMOL/L (ref 3.5–5.1)
POTASSIUM SERPL-SCNC: 4.8 MMOL/L (ref 3.5–5.1)
RBC # BLD: 4.24 M/UL (ref 4.2–5.9)
SODIUM BLD-SCNC: 128 MMOL/L (ref 136–145)
SODIUM BLD-SCNC: 131 MMOL/L (ref 136–145)
TOTAL PROTEIN: 8.3 G/DL (ref 6.4–8.2)
TROPONIN: 0.03 NG/ML
TROPONIN: 0.05 NG/ML
WBC # BLD: 6.9 K/UL (ref 4–11)

## 2019-07-31 PROCEDURE — 84484 ASSAY OF TROPONIN QUANT: CPT

## 2019-07-31 PROCEDURE — 36415 COLL VENOUS BLD VENIPUNCTURE: CPT

## 2019-07-31 PROCEDURE — 0D9780Z DRAINAGE OF STOMACH, PYLORUS WITH DRAINAGE DEVICE, VIA NATURAL OR ARTIFICIAL OPENING ENDOSCOPIC: ICD-10-PCS | Performed by: RADIOLOGY

## 2019-07-31 PROCEDURE — 96375 TX/PRO/DX INJ NEW DRUG ADDON: CPT

## 2019-07-31 PROCEDURE — 85025 COMPLETE CBC W/AUTO DIFF WBC: CPT

## 2019-07-31 PROCEDURE — 2060000000 HC ICU INTERMEDIATE R&B

## 2019-07-31 PROCEDURE — 96376 TX/PRO/DX INJ SAME DRUG ADON: CPT

## 2019-07-31 PROCEDURE — 96361 HYDRATE IV INFUSION ADD-ON: CPT

## 2019-07-31 PROCEDURE — 71045 X-RAY EXAM CHEST 1 VIEW: CPT

## 2019-07-31 PROCEDURE — 87040 BLOOD CULTURE FOR BACTERIA: CPT

## 2019-07-31 PROCEDURE — 72100 X-RAY EXAM L-S SPINE 2/3 VWS: CPT

## 2019-07-31 PROCEDURE — 96374 THER/PROPH/DIAG INJ IV PUSH: CPT

## 2019-07-31 PROCEDURE — 2580000003 HC RX 258: Performed by: EMERGENCY MEDICINE

## 2019-07-31 PROCEDURE — 73552 X-RAY EXAM OF FEMUR 2/>: CPT

## 2019-07-31 PROCEDURE — 93005 ELECTROCARDIOGRAM TRACING: CPT | Performed by: EMERGENCY MEDICINE

## 2019-07-31 PROCEDURE — 83605 ASSAY OF LACTIC ACID: CPT

## 2019-07-31 PROCEDURE — 74176 CT ABD & PELVIS W/O CONTRAST: CPT

## 2019-07-31 PROCEDURE — 02HV33Z INSERTION OF INFUSION DEVICE INTO SUPERIOR VENA CAVA, PERCUTANEOUS APPROACH: ICD-10-PCS | Performed by: RADIOLOGY

## 2019-07-31 PROCEDURE — 80053 COMPREHEN METABOLIC PANEL: CPT

## 2019-07-31 PROCEDURE — 99285 EMERGENCY DEPT VISIT HI MDM: CPT

## 2019-07-31 PROCEDURE — 74018 RADEX ABDOMEN 1 VIEW: CPT

## 2019-07-31 PROCEDURE — 6360000002 HC RX W HCPCS: Performed by: EMERGENCY MEDICINE

## 2019-07-31 RX ORDER — HYDROMORPHONE HCL 110MG/55ML
0.5 PATIENT CONTROLLED ANALGESIA SYRINGE INTRAVENOUS
Status: COMPLETED | OUTPATIENT
Start: 2019-07-31 | End: 2019-07-31

## 2019-07-31 RX ORDER — OXYCODONE HCL 10 MG/1
10 TABLET, FILM COATED, EXTENDED RELEASE ORAL EVERY 4 HOURS
COMMUNITY

## 2019-07-31 RX ORDER — 0.9 % SODIUM CHLORIDE 0.9 %
1000 INTRAVENOUS SOLUTION INTRAVENOUS ONCE
Status: COMPLETED | OUTPATIENT
Start: 2019-07-31 | End: 2019-07-31

## 2019-07-31 RX ORDER — ONDANSETRON 2 MG/ML
4 INJECTION INTRAMUSCULAR; INTRAVENOUS
Status: COMPLETED | OUTPATIENT
Start: 2019-07-31 | End: 2019-07-31

## 2019-07-31 RX ORDER — ONDANSETRON 2 MG/ML
4 INJECTION INTRAMUSCULAR; INTRAVENOUS
Status: DISCONTINUED | OUTPATIENT
Start: 2019-07-31 | End: 2019-08-09 | Stop reason: SDUPTHER

## 2019-07-31 RX ORDER — 0.9 % SODIUM CHLORIDE 0.9 %
30 INTRAVENOUS SOLUTION INTRAVENOUS ONCE
Status: COMPLETED | OUTPATIENT
Start: 2019-07-31 | End: 2019-07-31

## 2019-07-31 RX ORDER — 0.9 % SODIUM CHLORIDE 0.9 %
1000 INTRAVENOUS SOLUTION INTRAVENOUS ONCE
Status: COMPLETED | OUTPATIENT
Start: 2019-07-31 | End: 2019-08-01

## 2019-07-31 RX ORDER — PROMETHAZINE HYDROCHLORIDE 25 MG/ML
12.5 INJECTION, SOLUTION INTRAMUSCULAR; INTRAVENOUS ONCE
Status: COMPLETED | OUTPATIENT
Start: 2019-07-31 | End: 2019-07-31

## 2019-07-31 RX ADMIN — HYDROMORPHONE HYDROCHLORIDE 0.5 MG: 2 INJECTION, SOLUTION INTRAMUSCULAR; INTRAVENOUS; SUBCUTANEOUS at 17:15

## 2019-07-31 RX ADMIN — ONDANSETRON 4 MG: 2 INJECTION INTRAMUSCULAR; INTRAVENOUS at 16:20

## 2019-07-31 RX ADMIN — SODIUM CHLORIDE 1000 ML: 9 INJECTION, SOLUTION INTRAVENOUS at 22:46

## 2019-07-31 RX ADMIN — HYDROMORPHONE HYDROCHLORIDE 0.5 MG: 2 INJECTION, SOLUTION INTRAMUSCULAR; INTRAVENOUS; SUBCUTANEOUS at 22:03

## 2019-07-31 RX ADMIN — SODIUM CHLORIDE 1539 ML: 9 INJECTION, SOLUTION INTRAVENOUS at 16:20

## 2019-07-31 RX ADMIN — PROMETHAZINE HYDROCHLORIDE 12.5 MG: 25 INJECTION INTRAMUSCULAR; INTRAVENOUS at 19:02

## 2019-07-31 RX ADMIN — ONDANSETRON 4 MG: 2 INJECTION INTRAMUSCULAR; INTRAVENOUS at 20:51

## 2019-07-31 RX ADMIN — ONDANSETRON 4 MG: 2 INJECTION INTRAMUSCULAR; INTRAVENOUS at 18:06

## 2019-07-31 RX ADMIN — SODIUM CHLORIDE 1000 ML: 9 INJECTION, SOLUTION INTRAVENOUS at 17:15

## 2019-07-31 ASSESSMENT — PAIN SCALES - GENERAL
PAINLEVEL_OUTOF10: 10

## 2019-07-31 ASSESSMENT — PAIN DESCRIPTION - PAIN TYPE: TYPE: ACUTE PAIN

## 2019-07-31 ASSESSMENT — PAIN DESCRIPTION - LOCATION: LOCATION: BACK

## 2019-07-31 NOTE — ED PROVIDER NOTES
service: Not on file     Active member of club or organization: Not on file     Attends meetings of clubs or organizations: Not on file     Relationship status: Not on file    Intimate partner violence:     Fear of current or ex partner: Not on file     Emotionally abused: Not on file     Physically abused: Not on file     Forced sexual activity: Not on file   Other Topics Concern    Not on file   Social History Narrative    5/2011:lives at home with parents;on disability due to ?mental     Current Facility-Administered Medications   Medication Dose Route Frequency Provider Last Rate Last Dose    ondansetron (ZOFRAN) injection 4 mg  4 mg Intravenous Q1H PRN Joseph Mueller MD   4 mg at 07/31/19 2051    0.9 % sodium chloride bolus  1,000 mL Intravenous Once Joseph Mueller MD 1,000 mL/hr at 07/31/19 2246 1,000 mL at 07/31/19 2246     Current Outpatient Medications   Medication Sig Dispense Refill    oxyCODONE (OXYCONTIN) 10 MG extended release tablet Take 10 mg by mouth every 4 hours.  insulin glargine (LANTUS) 100 UNIT/ML injection vial Inject 20 Units into the skin 2 times daily       JANUVIA 100 MG tablet Take 100 mg by mouth daily       lisinopril (PRINIVIL;ZESTRIL) 20 MG tablet TAKE 1 TABLET BY MOUTH DAILY 30 tablet 5     No Known Allergies    REVIEW OF SYSTEMS  10 systems reviewed, pertinent positives per HPI otherwise noted to be negative. PHYSICAL EXAM  BP (!) 108/54   Pulse 108   Temp 97.9 °F (36.6 °C) (Oral)   Resp 20   Ht 5' 8\" (1.727 m)   Wt 113 lb (51.3 kg)   SpO2 96%   BMI 17.18 kg/m²    GENERAL APPEARANCE: Awake and alert. Cooperative. No acute distress. HENT: Normocephalic. Atraumatic. Mucous membranes are tachy  NECK: Supple. EYES: PERRL. EOM's grossly intact. HEART/CHEST: RRR. No murmurs. LUNGS: Respirations unlabored. CTAB. Good air exchange. Speaking comfortably in full sentences. ABDOMEN: No tenderness. Soft. Non-distended. No masses. No organomegaly.  No guarding or rebound. MUSCULOSKELETAL: No extremity edema. Compartments soft. No deformity. No focal tenderness in the extremities. Mild discomfort when palpating the left upper leg without focal bony point tenderness. All extremities neurovascularly intact. SKIN: Warm and dry. No acute rashes. NEUROLOGICAL: Alert and oriented. CN's 2-12 intact. No gross facial drooping. Strength 5/5, sensation intact. No gross focal deficits. PSYCHIATRIC: Normal mood and affect. LABS  I have reviewed all labs for this visit.    Results for orders placed or performed during the hospital encounter of 07/31/19   CBC auto differential   Result Value Ref Range    WBC 6.9 4.0 - 11.0 K/uL    RBC 4.24 4.20 - 5.90 M/uL    Hemoglobin 11.3 (L) 13.5 - 17.5 g/dL    Hematocrit 36.2 (L) 40.5 - 52.5 %    MCV 85.4 80.0 - 100.0 fL    MCH 26.7 26.0 - 34.0 pg    MCHC 31.2 31.0 - 36.0 g/dL    RDW 17.8 (H) 12.4 - 15.4 %    Platelets 842 962 - 863 K/uL    MPV 7.5 5.0 - 10.5 fL    Neutrophils % 77.9 %    Lymphocytes % 11.0 %    Monocytes % 10.5 %    Eosinophils % 0.2 %    Basophils % 0.4 %    Neutrophils # 5.4 1.7 - 7.7 K/uL    Lymphocytes # 0.8 (L) 1.0 - 5.1 K/uL    Monocytes # 0.7 0.0 - 1.3 K/uL    Eosinophils # 0.0 0.0 - 0.6 K/uL    Basophils # 0.0 0.0 - 0.2 K/uL   Comprehensive metabolic panel   Result Value Ref Range    Sodium 128 (L) 136 - 145 mmol/L    Potassium 4.8 3.5 - 5.1 mmol/L    Chloride 85 (L) 99 - 110 mmol/L    CO2 20 (L) 21 - 32 mmol/L    Anion Gap 23 (H) 3 - 16    Glucose 223 (H) 70 - 99 mg/dL    BUN 57 (H) 7 - 20 mg/dL    CREATININE 5.6 (HH) 0.8 - 1.3 mg/dL    GFR Non-African American 10 (A) >60    GFR  12 (A) >60    Calcium 9.9 8.3 - 10.6 mg/dL    Total Protein 8.3 (H) 6.4 - 8.2 g/dL    Alb 3.7 3.4 - 5.0 g/dL    Albumin/Globulin Ratio 0.8 (L) 1.1 - 2.2    Total Bilirubin 0.4 0.0 - 1.0 mg/dL    Alkaline Phosphatase 91 40 - 129 U/L    ALT 7 (L) 10 - 40 U/L    AST 11 (L) 15 - 37 U/L    Globulin 4.6 g/dL   Troponin PROVIDED HISTORY: Additional Contrast?->None Reason for Exam: n/v.  heather Acuity: Acute Type of Exam: Initial FINDINGS: The study is limited without intravenous contrast.  Also, there is motion artifact throughout the study. Lower chest:  A moderate-to-large left pleural effusion is increased since the prior study. Multiple soft tissue pleural implants are again noted. Soft tissue implants are overall increased including an implant adjacent to the anterior aspect of the pericardial sac measuring 3.3 x 2.3 cm, previously 1.8 x 1.2 cm. Organs: Noncontrast images of the liver, gallbladder, spleen, pancreas, adrenal glands and right kidney show no acute abnormality. The left kidney is not visualized GI/Bowel: There is evidence of a high-grade small-bowel obstruction. There are multiple loops of dilated bowel within a left hemidiaphragmatic defect. There is engorgement the vasculature of the bowel in the hernia with associated edema. Dilated loops of bowel within the abdominal cavity are also present associated with twisting of the mesentery. Stool and gas are present throughout the colon. Pelvis: The prostate gland is enlarged. The urinary bladder is distended but otherwise unremarkable. Peritoneum/Retroperitoneum: The aorta is normal in caliber. Bones/Soft Tissues: There is fluid within the diaphragmatic hernia sac as well as within the pelvis. No extraluminal gas or focal fluid collection. Lytic lesion of the L2 vertebral body with soft tissue extension has increased, encroaching on the spinal canal, probably more so on the prior study; degree of encroachment is difficult to determine without intravenous contrast.     The study is limited without intravenous contrast, particularly given motion artifact. Evidence of small bowel obstruction. There are dilated loops of bowel within a large left hemidiaphragmatic focal defect with associated engorgement of vessels and mesenteric edema.   There is also twisting of pelvis to further evaluate for any progression. Patient was given pain control in addition to the antiemetics. We will continue with aggressive fluid hydration. His renal function will need to be trended at this time he has no hyperkalemia. Certainly if his creatinine is not continuing to improve significantly in the morning he may benefit from nephrology consult at that time. I spoke with Dr. Pastor Galeano. We thoroughly discussed the history, physical exam, laboratory and imaging studies, as well as, emergency department course. Based upon that discussion, we've decided to admit Kev Ricketts for further observation and evaluation of Jaycee Patterson's SBO, ALEXUS, L2 fracture. As I have deemed necessary from their history, physical and studies, I have considered and evaluated Kev Ricketts for the following diagnoses:  ACUTE APPENDICITIS, BOWEL OBSTRUCTION, CHOLECYSTITIS, DIVERTICULITIS, INCARCERATED HERNIA, PANCREATITIS, or PERFORATED BOWEL or ULCER. During the patient's ED course, the patient was given:  Medications   ondansetron [facility]) injection 4 mg (4 mg Intravenous Given 7/31/19 2051)   0.9 % sodium chloride bolus (1,000 mLs Intravenous New Bag 7/31/19 2246)   0.9 % sodium chloride bolus (0 mL/kg × 51.3 kg Intravenous Stopped 7/1953)   ondansetron (ZOFRAN) injection 4 mg (4 mg Intravenous Given 7/31/19 1806)   HYDROmorphone (DILAUDID) injection 0.5 mg (0.5 mg Intravenous Given 7/31/19 2203)   0.9 % sodium chloride bolus (0 mLs Intravenous Stopped 7/1953)   promethazine (PHENERGAN) injection 12.5 mg (12.5 mg Intramuscular Given 7/31/19 1902)        CLINICAL IMPRESSION  1. ALEXUS (acute kidney injury) ([address]    2. SBO (small bowel obstruction) (HCC)    3. Closed fracture of second lumbar vertebra, unspecified fracture morphology, initial encounter ([address]    4. Lung cancer metastatic to bone ([address]    5. History of renal cell cancer    6.  S/p nephrectomy        Blood pressure (!)

## 2019-08-01 PROBLEM — E43 SEVERE PROTEIN-CALORIE MALNUTRITION (HCC): Chronic | Status: ACTIVE | Noted: 2017-09-06

## 2019-08-01 LAB
ALBUMIN SERPL-MCNC: 3 G/DL (ref 3.4–5)
AMORPHOUS: ABNORMAL /HPF
ANION GAP SERPL CALCULATED.3IONS-SCNC: 14 MMOL/L (ref 3–16)
BACTERIA: ABNORMAL /HPF
BILIRUBIN URINE: ABNORMAL
BLOOD, URINE: ABNORMAL
BUN BLDV-MCNC: 52 MG/DL (ref 7–20)
CALCIUM SERPL-MCNC: 8.7 MG/DL (ref 8.3–10.6)
CASTS 2: ABNORMAL /LPF
CASTS: ABNORMAL /LPF
CHLORIDE BLD-SCNC: 97 MMOL/L (ref 99–110)
CLARITY: ABNORMAL
CO2: 23 MMOL/L (ref 21–32)
COLOR: ABNORMAL
CREAT SERPL-MCNC: 3.4 MG/DL (ref 0.8–1.3)
EKG ATRIAL RATE: 105 BPM
EKG DIAGNOSIS: NORMAL
EKG P AXIS: 65 DEGREES
EKG P-R INTERVAL: 140 MS
EKG Q-T INTERVAL: 322 MS
EKG QRS DURATION: 82 MS
EKG QTC CALCULATION (BAZETT): 425 MS
EKG R AXIS: -16 DEGREES
EKG T AXIS: 77 DEGREES
EKG VENTRICULAR RATE: 105 BPM
EPITHELIAL CELLS, UA: ABNORMAL /HPF
GFR AFRICAN AMERICAN: 22
GFR NON-AFRICAN AMERICAN: 18
GLUCOSE BLD-MCNC: 101 MG/DL (ref 70–99)
GLUCOSE BLD-MCNC: 72 MG/DL (ref 70–99)
GLUCOSE BLD-MCNC: 84 MG/DL (ref 70–99)
GLUCOSE BLD-MCNC: 85 MG/DL (ref 70–99)
GLUCOSE BLD-MCNC: 90 MG/DL (ref 70–99)
GLUCOSE URINE: NEGATIVE MG/DL
KETONES, URINE: NEGATIVE MG/DL
LEUKOCYTE ESTERASE, URINE: NEGATIVE
MICROSCOPIC EXAMINATION: YES
MUCUS: ABNORMAL /LPF
NITRITE, URINE: NEGATIVE
PERFORMED ON: ABNORMAL
PERFORMED ON: NORMAL
PH UA: 5.5 (ref 5–8)
PHOSPHORUS: 3.1 MG/DL (ref 2.5–4.9)
POTASSIUM SERPL-SCNC: 4 MMOL/L (ref 3.5–5.1)
PROTEIN UA: 100 MG/DL
RBC UA: ABNORMAL /HPF (ref 0–2)
SODIUM BLD-SCNC: 134 MMOL/L (ref 136–145)
SPECIFIC GRAVITY UA: 1.02 (ref 1–1.03)
URINE REFLEX TO CULTURE: ABNORMAL
URINE TYPE: ABNORMAL
UROBILINOGEN, URINE: 0.2 E.U./DL
WBC UA: ABNORMAL /HPF (ref 0–5)

## 2019-08-01 PROCEDURE — 93010 ELECTROCARDIOGRAM REPORT: CPT | Performed by: INTERNAL MEDICINE

## 2019-08-01 PROCEDURE — 2060000000 HC ICU INTERMEDIATE R&B

## 2019-08-01 PROCEDURE — 6360000002 HC RX W HCPCS: Performed by: INTERNAL MEDICINE

## 2019-08-01 PROCEDURE — 36415 COLL VENOUS BLD VENIPUNCTURE: CPT

## 2019-08-01 PROCEDURE — 2580000003 HC RX 258: Performed by: INTERNAL MEDICINE

## 2019-08-01 PROCEDURE — 2580000003 HC RX 258: Performed by: PHYSICIAN ASSISTANT

## 2019-08-01 PROCEDURE — 99223 1ST HOSP IP/OBS HIGH 75: CPT | Performed by: SURGERY

## 2019-08-01 PROCEDURE — 80069 RENAL FUNCTION PANEL: CPT

## 2019-08-01 PROCEDURE — 81001 URINALYSIS AUTO W/SCOPE: CPT

## 2019-08-01 PROCEDURE — 6370000000 HC RX 637 (ALT 250 FOR IP): Performed by: INTERNAL MEDICINE

## 2019-08-01 RX ORDER — SODIUM CHLORIDE 9 MG/ML
INJECTION, SOLUTION INTRAVENOUS CONTINUOUS
Status: DISCONTINUED | OUTPATIENT
Start: 2019-08-01 | End: 2019-08-02

## 2019-08-01 RX ORDER — SODIUM CHLORIDE 0.9 % (FLUSH) 0.9 %
10 SYRINGE (ML) INJECTION EVERY 12 HOURS SCHEDULED
Status: DISCONTINUED | OUTPATIENT
Start: 2019-08-01 | End: 2019-08-09 | Stop reason: SDUPTHER

## 2019-08-01 RX ORDER — SODIUM CHLORIDE 0.9 % (FLUSH) 0.9 %
10 SYRINGE (ML) INJECTION PRN
Status: DISCONTINUED | OUTPATIENT
Start: 2019-08-01 | End: 2019-08-09 | Stop reason: SDUPTHER

## 2019-08-01 RX ORDER — ONDANSETRON 2 MG/ML
4 INJECTION INTRAMUSCULAR; INTRAVENOUS EVERY 6 HOURS PRN
Status: DISCONTINUED | OUTPATIENT
Start: 2019-08-01 | End: 2019-08-10 | Stop reason: HOSPADM

## 2019-08-01 RX ORDER — POTASSIUM CHLORIDE 7.45 MG/ML
10 INJECTION INTRAVENOUS PRN
Status: DISCONTINUED | OUTPATIENT
Start: 2019-08-01 | End: 2019-08-01

## 2019-08-01 RX ORDER — MORPHINE SULFATE 4 MG/ML
2 INJECTION, SOLUTION INTRAMUSCULAR; INTRAVENOUS EVERY 4 HOURS PRN
Status: DISCONTINUED | OUTPATIENT
Start: 2019-08-01 | End: 2019-08-07

## 2019-08-01 RX ORDER — HEPARIN SODIUM 5000 [USP'U]/ML
5000 INJECTION, SOLUTION INTRAVENOUS; SUBCUTANEOUS EVERY 8 HOURS SCHEDULED
Status: DISCONTINUED | OUTPATIENT
Start: 2019-08-01 | End: 2019-08-04

## 2019-08-01 RX ORDER — DEXTROSE AND SODIUM CHLORIDE 5; .45 G/100ML; G/100ML
INJECTION, SOLUTION INTRAVENOUS CONTINUOUS
Status: DISCONTINUED | OUTPATIENT
Start: 2019-08-01 | End: 2019-08-02

## 2019-08-01 RX ADMIN — SODIUM CHLORIDE: 9 INJECTION, SOLUTION INTRAVENOUS at 01:04

## 2019-08-01 RX ADMIN — Medication 1 SPRAY: at 15:03

## 2019-08-01 RX ADMIN — HEPARIN SODIUM 5000 UNITS: 5000 INJECTION INTRAVENOUS; SUBCUTANEOUS at 21:46

## 2019-08-01 RX ADMIN — MORPHINE SULFATE 2 MG: 4 INJECTION INTRAVENOUS at 15:03

## 2019-08-01 RX ADMIN — MORPHINE SULFATE 2 MG: 4 INJECTION INTRAVENOUS at 06:58

## 2019-08-01 RX ADMIN — Medication 10 ML: at 09:52

## 2019-08-01 RX ADMIN — MORPHINE SULFATE 2 MG: 4 INJECTION INTRAVENOUS at 21:51

## 2019-08-01 RX ADMIN — HEPARIN SODIUM 5000 UNITS: 5000 INJECTION INTRAVENOUS; SUBCUTANEOUS at 06:35

## 2019-08-01 RX ADMIN — SODIUM CHLORIDE: 9 INJECTION, SOLUTION INTRAVENOUS at 11:24

## 2019-08-01 RX ADMIN — DEXTROSE AND SODIUM CHLORIDE: 5; 450 INJECTION, SOLUTION INTRAVENOUS at 16:55

## 2019-08-01 RX ADMIN — MORPHINE SULFATE 2 MG: 4 INJECTION INTRAVENOUS at 01:04

## 2019-08-01 RX ADMIN — Medication 10 ML: at 21:53

## 2019-08-01 RX ADMIN — MORPHINE SULFATE 2 MG: 4 INJECTION INTRAVENOUS at 11:24

## 2019-08-01 ASSESSMENT — PAIN SCALES - GENERAL
PAINLEVEL_OUTOF10: 7
PAINLEVEL_OUTOF10: 8
PAINLEVEL_OUTOF10: 0
PAINLEVEL_OUTOF10: 8
PAINLEVEL_OUTOF10: 7
PAINLEVEL_OUTOF10: 10
PAINLEVEL_OUTOF10: 6
PAINLEVEL_OUTOF10: 8
PAINLEVEL_OUTOF10: 0
PAINLEVEL_OUTOF10: 3
PAINLEVEL_OUTOF10: 10

## 2019-08-01 ASSESSMENT — PAIN DESCRIPTION - ONSET: ONSET: ON-GOING

## 2019-08-01 ASSESSMENT — PAIN DESCRIPTION - LOCATION
LOCATION: BACK
LOCATION: GENERALIZED

## 2019-08-01 ASSESSMENT — PAIN DESCRIPTION - DESCRIPTORS: DESCRIPTORS: SHARP;SHOOTING

## 2019-08-01 ASSESSMENT — PAIN DESCRIPTION - ORIENTATION: ORIENTATION: OTHER (COMMENT)

## 2019-08-01 ASSESSMENT — PAIN DESCRIPTION - FREQUENCY: FREQUENCY: INTERMITTENT

## 2019-08-01 ASSESSMENT — PAIN DESCRIPTION - PAIN TYPE
TYPE: CHRONIC PAIN
TYPE: CHRONIC PAIN

## 2019-08-01 NOTE — PLAN OF CARE
Problem: Risk for Impaired Skin Integrity  Goal: Tissue integrity - skin and mucous membranes  Description  Structural intactness and normal physiological function of skin and  mucous membranes.   Outcome: Ongoing  Note:   Mepilex to coccyx, heels elevated off bed     Problem: Pain:  Goal: Pain level will decrease  Description  Pain level will decrease  Outcome: Ongoing  Goal: Control of acute pain  Description  Control of acute pain  Outcome: Ongoing  Goal: Control of chronic pain  Description  Control of chronic pain  Outcome: Ongoing  Note:   PRN morphine 2 mg Q 4 hr     Problem: ABCDS Injury Assessment  Goal: Absence of physical injury  Outcome: Ongoing

## 2019-08-01 NOTE — PROGRESS NOTES
C/o sore throat from NG. Dr Garrick Almaguer informed, verbal order for chloraseptic spray po. Has attempted to void per urinal with a small amount of urine voided, clear yellow. Repositioned , 2 assist, pillow support to hips, back, heels off bed, call light in reach.

## 2019-08-01 NOTE — PROGRESS NOTES
Enteric tube projects within the stomach. CT ABDOMEN PELVIS WO CONTRAST Additional Contrast? None   Final Result   The study is limited without intravenous contrast, particularly given motion   artifact. Evidence of small bowel obstruction. There are dilated loops of   bowel within a large left hemidiaphragmatic focal defect with associated   engorgement of vessels and mesenteric edema. There is also twisting of   dilated small bowel loops in the abdominal cavity. Small-bowel obstruction   is likely related to entrapment within the diaphragmatic hernia and/or   internal hernia related to adhesions. Increased pleural and epicardial soft tissue masses. Increased extent of soft tissue mass associated with lytic L2 lesion,   encroaching on the spinal canal, probably more so than the prior study. Degree of stenosis of the spinal canal is difficult to determine,   particularly without intravenous contrast.  MRI is recommended to further   evaluate, possibly emergent, depending on the clinical circumstances. Findings were discussed with Milton Murray at approximately 8:56 p.m. on   7/31/2019. XR LUMBAR SPINE (2-3 VIEWS)   Final Result   Pathologic fracture L2, evidence for bony destruction of the posterior aspect   of the vertebral body extending into the pedicles. A lumbar spine CT or MRI   would be helpful for further evaluation         XR FEMUR LEFT (MIN 2 VIEWS)   Final Result   No acute osseous abnormality. XR CHEST PORTABLE   Final Result   Nodular density in the periphery of the left lung.   Correlate with recent CT   chest.      Elevation of the left hemidiaphragm      Old right-sided rib fractures      No acute abnormality otherwise             Assessment/Plan:  SBO  - Noted on CT   - NG tube placed   - NPO   -  general surgery consult   - PRN symptom control     ALEXUS  - Baseline creatinine 1.0  - Creatinine on admission 5.6-->4.4-->3.4  - Takes ACE-I at home, held   -

## 2019-08-01 NOTE — PROGRESS NOTES
Morphine 2 mg IVP for c/o recurring lower back pain, repositioned to L side with pillow support to back and hips. Phenol spray administered orally for c/o sore throat. Family at bedside. Call light in reach.

## 2019-08-01 NOTE — ED NOTES
Per Dr. Dajuan Lobo, No need to repeat lactic due to no signs of any infection.       Liz Stanton RN  07/31/19 2037

## 2019-08-01 NOTE — PROGRESS NOTES
(quadriceps), Clavicles (pectoralis and deltoids), Temples (temporalis muscle)  5. Fluid Accumulation-No significant fluid accumulation,    6.   Strength-Not measured    Nutrition Risk Level: High    Nutrient Needs:  · Estimated Daily Total Kcal: 7993 - 2040 kcals based on 35-40 kcals/kg/CBW  · Estimated Daily Protein (g): 66 - 77 g protein based on 1.3-1.5 g/kg/CBW  · Estimated Daily Total Fluid (ml/day): 1700 - 2000 ml     Nutrition Diagnosis:   · Problem: Severe malnutrition  · Etiology: related to Alteration in GI function, Nausea, Vomiting, Pain, Insufficient energy/nutrient consumption, Lack or limited access to food     Signs and symptoms:  as evidenced by NPO status due to medical condition, BMI, Lab values, GI abnormality, Nausea, Vomiting    Objective Information:  · Nutrition-Focused Physical Findings: patient was laying in bed this afternoon upon entering his room for initial assessment; patient's brother had stepped out of the room and was not in there initially; patient was laying on his L side because he has chronic back pain; patient appears much older than stated age and he appeared to be very fatigued this afternoon; + NG in place to suction and contents observed going through tube into canister behind his bed; patient is edentulous - he has dentures that fit well and patient uses them to consume po; patient shows physical s/s of severe malnutrition; he is very thin and frail; per patient's brother, patient completed what he thinks is patient's last chemo treatment within the past 2 weeks; patient c/o constipation usually (he is on chronic pain meds) but 2 weeks ago, he had a loose BM with blood in it; patient's mucous membranes appear dry; patient is from home where he he lives with his girlfriend in a trailer; his girlfriend apparently does not help do anything at home, such as: cooking, cleaning, prepare meals for patient, etc.; patient still drives to get himself fast food each day despite

## 2019-08-02 ENCOUNTER — APPOINTMENT (OUTPATIENT)
Dept: GENERAL RADIOLOGY | Age: 64
DRG: 392 | End: 2019-08-02
Payer: MEDICARE

## 2019-08-02 LAB
A/G RATIO: 0.9 (ref 1.1–2.2)
ALBUMIN SERPL-MCNC: 3 G/DL (ref 3.4–5)
ALP BLD-CCNC: 73 U/L (ref 40–129)
ALT SERPL-CCNC: 8 U/L (ref 10–40)
ANION GAP SERPL CALCULATED.3IONS-SCNC: 15 MMOL/L (ref 3–16)
AST SERPL-CCNC: 16 U/L (ref 15–37)
BILIRUB SERPL-MCNC: 0.3 MG/DL (ref 0–1)
BUN BLDV-MCNC: 53 MG/DL (ref 7–20)
CALCIUM SERPL-MCNC: 8 MG/DL (ref 8.3–10.6)
CHLORIDE BLD-SCNC: 97 MMOL/L (ref 99–110)
CO2: 22 MMOL/L (ref 21–32)
CREAT SERPL-MCNC: 2.7 MG/DL (ref 0.8–1.3)
GFR AFRICAN AMERICAN: 29
GFR NON-AFRICAN AMERICAN: 24
GLOBULIN: 3.4 G/DL
GLUCOSE BLD-MCNC: 137 MG/DL (ref 70–99)
GLUCOSE BLD-MCNC: 185 MG/DL (ref 70–99)
GLUCOSE BLD-MCNC: 186 MG/DL (ref 70–99)
GLUCOSE BLD-MCNC: 214 MG/DL (ref 70–99)
GLUCOSE BLD-MCNC: 216 MG/DL (ref 70–99)
GLUCOSE BLD-MCNC: 221 MG/DL (ref 70–99)
GLUCOSE BLD-MCNC: 228 MG/DL (ref 70–99)
GLUCOSE BLD-MCNC: 264 MG/DL (ref 70–99)
HCT VFR BLD CALC: 29.7 % (ref 40.5–52.5)
HEMOGLOBIN: 9.6 G/DL (ref 13.5–17.5)
LACTIC ACID: 0.9 MMOL/L (ref 0.4–2)
MAGNESIUM: 2.2 MG/DL (ref 1.8–2.4)
MCH RBC QN AUTO: 27 PG (ref 26–34)
MCHC RBC AUTO-ENTMCNC: 32.2 G/DL (ref 31–36)
MCV RBC AUTO: 84 FL (ref 80–100)
PDW BLD-RTO: 17.3 % (ref 12.4–15.4)
PERFORMED ON: ABNORMAL
PHOSPHORUS: 2.9 MG/DL (ref 2.5–4.9)
PLATELET # BLD: 334 K/UL (ref 135–450)
PMV BLD AUTO: 8.4 FL (ref 5–10.5)
POTASSIUM REFLEX MAGNESIUM: 4.1 MMOL/L (ref 3.5–5.1)
RBC # BLD: 3.54 M/UL (ref 4.2–5.9)
SODIUM BLD-SCNC: 134 MMOL/L (ref 136–145)
TOTAL PROTEIN: 6.4 G/DL (ref 6.4–8.2)
WBC # BLD: 2.9 K/UL (ref 4–11)

## 2019-08-02 PROCEDURE — 6360000002 HC RX W HCPCS: Performed by: INTERNAL MEDICINE

## 2019-08-02 PROCEDURE — 2580000003 HC RX 258: Performed by: PHYSICIAN ASSISTANT

## 2019-08-02 PROCEDURE — 80053 COMPREHEN METABOLIC PANEL: CPT

## 2019-08-02 PROCEDURE — 36415 COLL VENOUS BLD VENIPUNCTURE: CPT

## 2019-08-02 PROCEDURE — 2580000003 HC RX 258: Performed by: INTERNAL MEDICINE

## 2019-08-02 PROCEDURE — 97535 SELF CARE MNGMENT TRAINING: CPT

## 2019-08-02 PROCEDURE — 99232 SBSQ HOSP IP/OBS MODERATE 35: CPT | Performed by: INTERNAL MEDICINE

## 2019-08-02 PROCEDURE — 97166 OT EVAL MOD COMPLEX 45 MIN: CPT

## 2019-08-02 PROCEDURE — 97162 PT EVAL MOD COMPLEX 30 MIN: CPT

## 2019-08-02 PROCEDURE — 85027 COMPLETE CBC AUTOMATED: CPT

## 2019-08-02 PROCEDURE — 83605 ASSAY OF LACTIC ACID: CPT

## 2019-08-02 PROCEDURE — 84100 ASSAY OF PHOSPHORUS: CPT

## 2019-08-02 PROCEDURE — 99232 SBSQ HOSP IP/OBS MODERATE 35: CPT | Performed by: SURGERY

## 2019-08-02 PROCEDURE — 2060000000 HC ICU INTERMEDIATE R&B

## 2019-08-02 PROCEDURE — 97530 THERAPEUTIC ACTIVITIES: CPT

## 2019-08-02 PROCEDURE — 83735 ASSAY OF MAGNESIUM: CPT

## 2019-08-02 PROCEDURE — 74019 RADEX ABDOMEN 2 VIEWS: CPT

## 2019-08-02 RX ORDER — 0.9 % SODIUM CHLORIDE 0.9 %
500 INTRAVENOUS SOLUTION INTRAVENOUS ONCE
Status: COMPLETED | OUTPATIENT
Start: 2019-08-02 | End: 2019-08-02

## 2019-08-02 RX ORDER — SODIUM CHLORIDE 9 MG/ML
INJECTION, SOLUTION INTRAVENOUS CONTINUOUS
Status: DISCONTINUED | OUTPATIENT
Start: 2019-08-02 | End: 2019-08-05

## 2019-08-02 RX ADMIN — MORPHINE SULFATE 2 MG: 4 INJECTION INTRAVENOUS at 11:53

## 2019-08-02 RX ADMIN — HEPARIN SODIUM 5000 UNITS: 5000 INJECTION INTRAVENOUS; SUBCUTANEOUS at 14:32

## 2019-08-02 RX ADMIN — DEXTROSE AND SODIUM CHLORIDE: 5; 450 INJECTION, SOLUTION INTRAVENOUS at 05:29

## 2019-08-02 RX ADMIN — Medication 10 ML: at 20:40

## 2019-08-02 RX ADMIN — HEPARIN SODIUM 5000 UNITS: 5000 INJECTION INTRAVENOUS; SUBCUTANEOUS at 20:33

## 2019-08-02 RX ADMIN — MORPHINE SULFATE 2 MG: 4 INJECTION INTRAVENOUS at 15:50

## 2019-08-02 RX ADMIN — HEPARIN SODIUM 5000 UNITS: 5000 INJECTION INTRAVENOUS; SUBCUTANEOUS at 05:30

## 2019-08-02 RX ADMIN — SODIUM CHLORIDE 500 ML: 9 INJECTION, SOLUTION INTRAVENOUS at 05:29

## 2019-08-02 RX ADMIN — SODIUM CHLORIDE: 9 INJECTION, SOLUTION INTRAVENOUS at 14:32

## 2019-08-02 RX ADMIN — Medication 10 ML: at 07:58

## 2019-08-02 RX ADMIN — MORPHINE SULFATE 2 MG: 4 INJECTION INTRAVENOUS at 20:33

## 2019-08-02 RX ADMIN — MORPHINE SULFATE 2 MG: 4 INJECTION INTRAVENOUS at 07:55

## 2019-08-02 ASSESSMENT — PAIN DESCRIPTION - PAIN TYPE
TYPE: CHRONIC PAIN

## 2019-08-02 ASSESSMENT — PAIN DESCRIPTION - FREQUENCY
FREQUENCY: CONTINUOUS

## 2019-08-02 ASSESSMENT — PAIN DESCRIPTION - LOCATION
LOCATION: BACK;HIP
LOCATION: HIP

## 2019-08-02 ASSESSMENT — PAIN DESCRIPTION - ORIENTATION
ORIENTATION: UPPER
ORIENTATION: LOWER;MID

## 2019-08-02 ASSESSMENT — PAIN SCALES - GENERAL
PAINLEVEL_OUTOF10: 10
PAINLEVEL_OUTOF10: 6
PAINLEVEL_OUTOF10: 6
PAINLEVEL_OUTOF10: 10

## 2019-08-02 ASSESSMENT — PAIN DESCRIPTION - DESCRIPTORS
DESCRIPTORS: SHARP;SHOOTING
DESCRIPTORS: SHARP;SHOOTING
DESCRIPTORS: ACHING;CONSTANT
DESCRIPTORS: SHARP;SHOOTING

## 2019-08-02 ASSESSMENT — PAIN DESCRIPTION - DIRECTION
RADIATING_TOWARDS: HIP

## 2019-08-02 ASSESSMENT — PAIN DESCRIPTION - PROGRESSION
CLINICAL_PROGRESSION: NOT CHANGED
CLINICAL_PROGRESSION: NOT CHANGED

## 2019-08-02 ASSESSMENT — PAIN - FUNCTIONAL ASSESSMENT: PAIN_FUNCTIONAL_ASSESSMENT: PREVENTS OR INTERFERES SOME ACTIVE ACTIVITIES AND ADLS

## 2019-08-02 ASSESSMENT — PAIN DESCRIPTION - ONSET
ONSET: ON-GOING

## 2019-08-02 NOTE — PROGRESS NOTES
Dr. Rosalba Guillaume at the bedside to examine pt. See progress note for details.  Pt/Ot, change IVF to 75 ml/hr

## 2019-08-02 NOTE — PROGRESS NOTES
Pt back from radiology. Repositioned in bed. FRANSISCO silverio NG connected back to CLWS with bile output. Call light within reach. Bed alarm turned on. Bed locked in lowest position.

## 2019-08-02 NOTE — PROGRESS NOTES
Bedside report given to [facility]. POC transferred.  [facility]

## 2019-08-02 NOTE — PROGRESS NOTES
2. 9   BUN 52* 52* 53*   CREATININE 4.4* 3.4* 2.7*     LIVER PROFILE:   Recent Labs     07/31/19  1610 08/02/19  0458   AST 11* 16   ALT 7* 8*   BILITOT 0.4 0.3   ALKPHOS 91 73       CULTURES  Blood Cx: Pending      RADIOLOGY  XR ABDOMEN (2 VIEWS)   Final Result   NG tube tip and proximal side-port reside in the gastric fundus. Dilated small bowel loops some of which extend into the left hemithorax seen   to better advantage on recent CT. The findings are consistent with a mid to   distal small bowel obstruction. XR ABDOMEN (KUB) (SINGLE AP VIEW)   Final Result   Enteric tube projects within the stomach. CT ABDOMEN PELVIS WO CONTRAST Additional Contrast? None   Final Result   The study is limited without intravenous contrast, particularly given motion   artifact. Evidence of small bowel obstruction. There are dilated loops of   bowel within a large left hemidiaphragmatic focal defect with associated   engorgement of vessels and mesenteric edema. There is also twisting of   dilated small bowel loops in the abdominal cavity. Small-bowel obstruction   is likely related to entrapment within the diaphragmatic hernia and/or   internal hernia related to adhesions. Increased pleural and epicardial soft tissue masses. Increased extent of soft tissue mass associated with lytic L2 lesion,   encroaching on the spinal canal, probably more so than the prior study. Degree of stenosis of the spinal canal is difficult to determine,   particularly without intravenous contrast.  MRI is recommended to further   evaluate, possibly emergent, depending on the clinical circumstances. Findings were discussed with Carmen Fountain at approximately 8:56 p.m. on   7/31/2019. XR LUMBAR SPINE (2-3 VIEWS)   Final Result   Pathologic fracture L2, evidence for bony destruction of the posterior aspect   of the vertebral body extending into the pedicles.   A lumbar spine CT or MRI   would be helpful for further evaluation         XR FEMUR LEFT (MIN 2 VIEWS)   Final Result   No acute osseous abnormality. XR CHEST PORTABLE   Final Result   Nodular density in the periphery of the left lung.   Correlate with recent CT   chest.      Elevation of the left hemidiaphragm      Old right-sided rib fractures      No acute abnormality otherwise             Assessment/Plan:  SBO  - Noted on CT   - NG to LWS  - NPO   -  general surgery consult   - PRN symptom control     ALEXUS  - Baseline creatinine 1.0  - Creatinine on admission 5.6-->4.4-->3.4-- > 2.7  - Takes ACE-I at home, held   - Also in setting of SBO, suspect pre-renal cause  - continue IVF   - Continue to monitor     Lumber fracture  -  Lytic lesions 2/2 Lung Ca with mets   - PRN pain control     Lung Cancer with mets  Hx of renal cell cancer s/p L nephrectomy    - dx with Renal cancer in June 2017   - Reports he follows with Dr. Carlos Priest  - Has multiple mets noted with increased masses on CT    Hyponatremia   - Improving with IVF   - Continue to monitor     DM2  - Lantus held 2/2 NPO   - Oral Rx held as well       HTN  - all home PO medications held   - BP is on lower end of normal      Debility   - consult PT,OT     moderate protein calorie malnutrition  - nutrition consult    DVT Prophylaxis: heparin   Diet: Diet NPO Effective Now  Code Status: Full Code        Dallin Board 1:24 PM 8/2/2019

## 2019-08-02 NOTE — PROGRESS NOTES
Shift assessment, completed, see flow sheet. Pt is alert and oriented x 3. Following commands. , /54, SpO2 94% on RA. Respirations are easy, even, and unlabored. Bilateral lung sounds diminished. L nare NG in place at 58, to CLWS, with bile output. 1 PIV, WNL with D5 1/2 NS @ 75 ml/hr and finishing up 500 ml bolus infusing. Blanchable redness to coccyx, with mepilex in place. Pt repositions self in bed and being turned q2h as needed. C/o chronic back pain shooting to L hip 6/10, gave prn morphine per order, see MAR. Pt repositioned as well for comfort. Call light within reach. Bed in lowest position. Bed alarm on.  Will continue to monitor

## 2019-08-02 NOTE — PROGRESS NOTES
Pt c/o 10/10 back pain shooting to L hip. Gave prn morphine per order, see MAR. Repositioned pt. Will continue to monitor.

## 2019-08-02 NOTE — PROGRESS NOTES
Pt BS is 185 at this time also with low BP. Will notify MD regarding IVF. Pt denies pain or needs. Repositioned. Call light within reach. Will monitor.

## 2019-08-02 NOTE — PROGRESS NOTES
Shift assessment completed. Pt is alert and oriented. Vital signs are WNL. Respirations are even & easy. Pt complains of discomfort to hips repositioned. Pt states that is better, heat offered and refused at this time. NG hooked to LCWS with green bile output. Bowel sounds present but are limited. Pt denies needs at this time. SR up x 2 and bed in low position. Call light is within reach. Will monitor.

## 2019-08-03 ENCOUNTER — APPOINTMENT (OUTPATIENT)
Dept: GENERAL RADIOLOGY | Age: 64
DRG: 392 | End: 2019-08-03
Payer: MEDICARE

## 2019-08-03 LAB
ANION GAP SERPL CALCULATED.3IONS-SCNC: 20 MMOL/L (ref 3–16)
BASOPHILS ABSOLUTE: 0 K/UL (ref 0–0.2)
BASOPHILS RELATIVE PERCENT: 0.8 %
BUN BLDV-MCNC: 33 MG/DL (ref 7–20)
CALCIUM SERPL-MCNC: 8.4 MG/DL (ref 8.3–10.6)
CHLORIDE BLD-SCNC: 96 MMOL/L (ref 99–110)
CO2: 20 MMOL/L (ref 21–32)
CREAT SERPL-MCNC: 1.3 MG/DL (ref 0.8–1.3)
EOSINOPHILS ABSOLUTE: 0 K/UL (ref 0–0.6)
EOSINOPHILS RELATIVE PERCENT: 1 %
GFR AFRICAN AMERICAN: >60
GFR NON-AFRICAN AMERICAN: 55
GLUCOSE BLD-MCNC: 198 MG/DL (ref 70–99)
GLUCOSE BLD-MCNC: 204 MG/DL (ref 70–99)
GLUCOSE BLD-MCNC: 205 MG/DL (ref 70–99)
GLUCOSE BLD-MCNC: 207 MG/DL (ref 70–99)
GLUCOSE BLD-MCNC: 208 MG/DL (ref 70–99)
GLUCOSE BLD-MCNC: 219 MG/DL (ref 70–99)
GLUCOSE BLD-MCNC: 238 MG/DL (ref 70–99)
HCT VFR BLD CALC: 30.8 % (ref 40.5–52.5)
HEMOGLOBIN: 9.9 G/DL (ref 13.5–17.5)
LYMPHOCYTES ABSOLUTE: 0.7 K/UL (ref 1–5.1)
LYMPHOCYTES RELATIVE PERCENT: 18.2 %
MCH RBC QN AUTO: 26.7 PG (ref 26–34)
MCHC RBC AUTO-ENTMCNC: 32.1 G/DL (ref 31–36)
MCV RBC AUTO: 83.2 FL (ref 80–100)
MONOCYTES ABSOLUTE: 0.7 K/UL (ref 0–1.3)
MONOCYTES RELATIVE PERCENT: 18.1 %
NEUTROPHILS ABSOLUTE: 2.5 K/UL (ref 1.7–7.7)
NEUTROPHILS RELATIVE PERCENT: 61.9 %
PDW BLD-RTO: 17.4 % (ref 12.4–15.4)
PERFORMED ON: ABNORMAL
PLATELET # BLD: 362 K/UL (ref 135–450)
PMV BLD AUTO: 8.1 FL (ref 5–10.5)
POTASSIUM SERPL-SCNC: 3.6 MMOL/L (ref 3.5–5.1)
RBC # BLD: 3.7 M/UL (ref 4.2–5.9)
SODIUM BLD-SCNC: 136 MMOL/L (ref 136–145)
WBC # BLD: 4 K/UL (ref 4–11)

## 2019-08-03 PROCEDURE — 2580000003 HC RX 258: Performed by: INTERNAL MEDICINE

## 2019-08-03 PROCEDURE — 74019 RADEX ABDOMEN 2 VIEWS: CPT

## 2019-08-03 PROCEDURE — 2060000000 HC ICU INTERMEDIATE R&B

## 2019-08-03 PROCEDURE — 6360000002 HC RX W HCPCS: Performed by: INTERNAL MEDICINE

## 2019-08-03 PROCEDURE — 85025 COMPLETE CBC W/AUTO DIFF WBC: CPT

## 2019-08-03 PROCEDURE — 80048 BASIC METABOLIC PNL TOTAL CA: CPT

## 2019-08-03 PROCEDURE — 99232 SBSQ HOSP IP/OBS MODERATE 35: CPT | Performed by: SURGERY

## 2019-08-03 PROCEDURE — 36415 COLL VENOUS BLD VENIPUNCTURE: CPT

## 2019-08-03 PROCEDURE — 97530 THERAPEUTIC ACTIVITIES: CPT

## 2019-08-03 PROCEDURE — 97110 THERAPEUTIC EXERCISES: CPT

## 2019-08-03 RX ADMIN — SODIUM CHLORIDE: 9 INJECTION, SOLUTION INTRAVENOUS at 02:58

## 2019-08-03 RX ADMIN — HEPARIN SODIUM 5000 UNITS: 5000 INJECTION INTRAVENOUS; SUBCUTANEOUS at 05:23

## 2019-08-03 RX ADMIN — MORPHINE SULFATE 2 MG: 4 INJECTION INTRAVENOUS at 20:58

## 2019-08-03 RX ADMIN — MORPHINE SULFATE 2 MG: 4 INJECTION INTRAVENOUS at 04:50

## 2019-08-03 RX ADMIN — MORPHINE SULFATE 2 MG: 4 INJECTION INTRAVENOUS at 14:28

## 2019-08-03 RX ADMIN — Medication 10 ML: at 20:53

## 2019-08-03 RX ADMIN — SODIUM CHLORIDE: 9 INJECTION, SOLUTION INTRAVENOUS at 14:45

## 2019-08-03 RX ADMIN — MORPHINE SULFATE 2 MG: 4 INJECTION INTRAVENOUS at 10:09

## 2019-08-03 RX ADMIN — HEPARIN SODIUM 5000 UNITS: 5000 INJECTION INTRAVENOUS; SUBCUTANEOUS at 20:50

## 2019-08-03 RX ADMIN — HEPARIN SODIUM 5000 UNITS: 5000 INJECTION INTRAVENOUS; SUBCUTANEOUS at 14:28

## 2019-08-03 ASSESSMENT — PAIN - FUNCTIONAL ASSESSMENT
PAIN_FUNCTIONAL_ASSESSMENT: PREVENTS OR INTERFERES SOME ACTIVE ACTIVITIES AND ADLS

## 2019-08-03 ASSESSMENT — PAIN SCALES - GENERAL
PAINLEVEL_OUTOF10: 5
PAINLEVEL_OUTOF10: 2
PAINLEVEL_OUTOF10: 4
PAINLEVEL_OUTOF10: 6
PAINLEVEL_OUTOF10: 10

## 2019-08-03 ASSESSMENT — PAIN DESCRIPTION - ORIENTATION
ORIENTATION: LOWER
ORIENTATION: LEFT

## 2019-08-03 ASSESSMENT — PAIN DESCRIPTION - DESCRIPTORS
DESCRIPTORS: ACHING;SORE;DISCOMFORT
DESCRIPTORS: ACHING;DISCOMFORT

## 2019-08-03 ASSESSMENT — PAIN DESCRIPTION - LOCATION
LOCATION: HIP;LEG
LOCATION: BACK;HIP
LOCATION: HIP;LEG
LOCATION: HIP

## 2019-08-03 ASSESSMENT — PAIN DESCRIPTION - PAIN TYPE
TYPE: CHRONIC PAIN

## 2019-08-03 ASSESSMENT — PAIN DESCRIPTION - PROGRESSION
CLINICAL_PROGRESSION: GRADUALLY WORSENING

## 2019-08-03 ASSESSMENT — PAIN DESCRIPTION - DIRECTION
RADIATING_TOWARDS: BACK
RADIATING_TOWARDS: DOWN LEGS
RADIATING_TOWARDS: DOWN LEGS
RADIATING_TOWARDS: DOWN LEG

## 2019-08-03 ASSESSMENT — PAIN DESCRIPTION - ONSET
ONSET: ON-GOING

## 2019-08-03 ASSESSMENT — PAIN DESCRIPTION - FREQUENCY
FREQUENCY: CONTINUOUS

## 2019-08-03 NOTE — PROGRESS NOTES
Patient in bed resting with eyes closed. BS obtained. Patient repositioned lying left side. Denies any needs at this time,will continue to monitor,call light within reach.

## 2019-08-03 NOTE — PLAN OF CARE
Problem: Risk for Impaired Skin Integrity  Goal: Tissue integrity - skin and mucous membranes  Description  Structural intactness and normal physiological function of skin and  mucous membranes.   8/2/2019 2347 by Maximo Strong RN  Outcome: Ongoing  Note:   Help/remind patient to turn Q 2hr  8/2/2019 1512 by Guerita Yanez RN  Outcome: Ongoing     Problem: Pain:  Goal: Pain level will decrease  Description  Pain level will decrease  8/2/2019 2347 by Maximo Strong RN  Outcome: Ongoing  Note:   PRN morphine to help control discomfort  8/2/2019 1512 by Guerita Yanez RN  Outcome: Ongoing  Goal: Control of acute pain  Description  Control of acute pain  8/2/2019 1512 by Guerita Yanez RN  Outcome: Ongoing  Goal: Control of chronic pain  Description  Control of chronic pain  8/2/2019 1512 by Guerita Yanez RN  Outcome: Ongoing     Problem: ABCDS Injury Assessment  Goal: Absence of physical injury  8/2/2019 1512 by Guerita Yanez RN  Outcome: Ongoing     Problem: Nutrition  Goal: Optimal nutrition therapy  8/2/2019 1512 by Guerita Yanez RN  Outcome: Ongoing  Goal: Understanding of nutritional guidelines  8/2/2019 1512 by Guerita Yanez RN  Outcome: Ongoing

## 2019-08-03 NOTE — FLOWSHEET NOTE
08/03/19 1009   Pain Assessment   Pain Assessment 0-10   Pain Level 6   Pain Type Chronic pain   Pain Location Hip;Leg   Pain Orientation Left   Pain Descriptors Aching;Sore;Discomfort   Pain Frequency Continuous   Pain Radiating Towards down legs   Pain Onset On-going   Clinical Progression Gradually worsening   Functional Pain Assessment Prevents or interferes some active activities and ADLs       Pain as shown above. PRN Morphine given per [facility] order. Pt. denies further needs at this time. Will continue to monitor. Call light is within reach.   Td Lira RN

## 2019-08-03 NOTE — FLOWSHEET NOTE
08/02/19 2053   Vitals   Temp 100.3 °F (37.9 °C)   Temp Source Oral   Pulse 112   Heart Rate Source Monitor   Resp 16   BP (!) 151/76   MAP (mmHg) 101   BP Location Right Arm   BP Upper/Lower Upper   Patient Position Semi fowlers   Level of Consciousness 0   MEWS Score 3   Patient Currently in Pain Yes   Cardiac Rhythm ST   Oxygen Therapy   SpO2 94 %   O2 Device None (Room air)   Shift assessment completed. Patient in bed awake,alert and oriented. C/o pain in hip. PRN morphine given and patient repositioned. Vitals obtained. Temp 100.3 will monitor. Evening medications per MD order see MAR. NG tube @ 58. Low wall suction, dark red output noted. Cream applied to sacrum per patient request. Patient denies any further needs at this time,will continue to monitor,call light within reach.

## 2019-08-03 NOTE — PROGRESS NOTES
07/31/19  1610 08/02/19  0458 08/03/19 0458   WBC 6.9 2.9* 4.0   HGB 11.3* 9.6* 9.9*   HCT 36.2* 29.7* 30.8*   MCV 85.4 84.0 83.2    334 362     BMP:   Recent Labs     08/01/19  0956 08/02/19  0458 08/03/19 0458   * 134* 136   K 4.0 4.1 3.6   CL 97* 97* 96*   CO2 23 22 20*   PHOS 3.1 2.9  --    BUN 52* 53* 33*   CREATININE 3.4* 2.7* 1.3     LIVER PROFILE:   Recent Labs     07/31/19  1610 08/02/19 0458   AST 11* 16   ALT 7* 8*   BILITOT 0.4 0.3   ALKPHOS 91 73       CULTURES  Blood Cx: Pending      RADIOLOGY  XR ABDOMEN (2 VIEWS)   Final Result   Mild progression of small bowel distention consistent with a mid to distal   obstruction. Enteric catheter in appropriate position. XR ABDOMEN (2 VIEWS)   Final Result   NG tube tip and proximal side-port reside in the gastric fundus. Dilated small bowel loops some of which extend into the left hemithorax seen   to better advantage on recent CT. The findings are consistent with a mid to   distal small bowel obstruction. XR ABDOMEN (KUB) (SINGLE AP VIEW)   Final Result   Enteric tube projects within the stomach. CT ABDOMEN PELVIS WO CONTRAST Additional Contrast? None   Final Result   The study is limited without intravenous contrast, particularly given motion   artifact. Evidence of small bowel obstruction. There are dilated loops of   bowel within a large left hemidiaphragmatic focal defect with associated   engorgement of vessels and mesenteric edema. There is also twisting of   dilated small bowel loops in the abdominal cavity. Small-bowel obstruction   is likely related to entrapment within the diaphragmatic hernia and/or   internal hernia related to adhesions. Increased pleural and epicardial soft tissue masses. Increased extent of soft tissue mass associated with lytic L2 lesion,   encroaching on the spinal canal, probably more so than the prior study.    Degree of stenosis of the spinal canal is difficult to determine,   particularly without intravenous contrast.  MRI is recommended to further   evaluate, possibly emergent, depending on the clinical circumstances. Findings were discussed with Carmen Fountain at approximately 8:56 p.m. on   7/31/2019. XR LUMBAR SPINE (2-3 VIEWS)   Final Result   Pathologic fracture L2, evidence for bony destruction of the posterior aspect   of the vertebral body extending into the pedicles. A lumbar spine CT or MRI   would be helpful for further evaluation         XR FEMUR LEFT (MIN 2 VIEWS)   Final Result   No acute osseous abnormality. XR CHEST PORTABLE   Final Result   Nodular density in the periphery of the left lung. Correlate with recent CT   chest.      Elevation of the left hemidiaphragm      Old right-sided rib fractures      No acute abnormality otherwise         XR ABDOMEN (2 VIEWS)    (Results Pending)       Assessment/Plan:  SBO  Complex left diaphragmatic hernia  -Bowel obstruction noted on CT - see the results above  -Seen by general surgeon- >  he has a complex left diaphragmatic hernia, medical management preferred. Needs to be transferred out if any surgical intervention needed  - NPO, IVF, NG to [facility]  -He is responding well. Exam benign, passing flatus. Check follow-up daily abdominal x-rays. Hopefully NG can be clamped tomorrow.      ALEXUS  - Baseline creatinine 1.0  -  in setting of SBO, suspect pre-renal cause  - Creatinine on admission 5.6-->4.4-->3.4-- > 2.7-- > 1.3 today  - Takes ACE-I at home, held   - continue IVF   - Continue to monitor , ALEXUS resolving    Lumber fracture  -  Lytic lesions 2/2 Lung Ca with mets   - PRN pain control     Lung Cancer with mets  Hx of renal cell cancer s/p L nephrectomy    - dx with Renal cancer in June 2017   - Reports he follows with Dr. Sophie Keller  - Has multiple mets noted with increased masses on CT    Hyponatremia   - Improving with IVF   - Continue to monitor     DM2  - Lantus held 2/2 NPO   - Oral Rx held as

## 2019-08-03 NOTE — PROGRESS NOTES
Bedside report given to Cas Rodrigues. Pt. denies further needs at this time. Call light is within reach.   Cm Troy RN

## 2019-08-03 NOTE — PROGRESS NOTES
Patient in bed resting with eyes closed, no distress noted. Call light within reach,will continue to monitor.

## 2019-08-04 ENCOUNTER — APPOINTMENT (OUTPATIENT)
Dept: GENERAL RADIOLOGY | Age: 64
DRG: 392 | End: 2019-08-04
Payer: MEDICARE

## 2019-08-04 LAB
ANION GAP SERPL CALCULATED.3IONS-SCNC: 22 MMOL/L (ref 3–16)
BUN BLDV-MCNC: 26 MG/DL (ref 7–20)
CALCIUM SERPL-MCNC: 8.7 MG/DL (ref 8.3–10.6)
CHLORIDE BLD-SCNC: 99 MMOL/L (ref 99–110)
CO2: 25 MMOL/L (ref 21–32)
CREAT SERPL-MCNC: 1 MG/DL (ref 0.8–1.3)
GFR AFRICAN AMERICAN: >60
GFR NON-AFRICAN AMERICAN: >60
GLUCOSE BLD-MCNC: 173 MG/DL (ref 70–99)
GLUCOSE BLD-MCNC: 209 MG/DL (ref 70–99)
GLUCOSE BLD-MCNC: 210 MG/DL (ref 70–99)
GLUCOSE BLD-MCNC: 219 MG/DL (ref 70–99)
GLUCOSE BLD-MCNC: 231 MG/DL (ref 70–99)
GLUCOSE BLD-MCNC: 241 MG/DL (ref 70–99)
HCT VFR BLD CALC: 33.8 % (ref 40.5–52.5)
HCT VFR BLD CALC: 34.3 % (ref 40.5–52.5)
HEMOGLOBIN: 10.7 G/DL (ref 13.5–17.5)
HEMOGLOBIN: 11 G/DL (ref 13.5–17.5)
OCCULT BLOOD, OTHER: ABNORMAL
PERFORMED ON: ABNORMAL
PH, GASTRIC: ABNORMAL
POTASSIUM SERPL-SCNC: 3.6 MMOL/L (ref 3.5–5.1)
SODIUM BLD-SCNC: 146 MMOL/L (ref 136–145)

## 2019-08-04 PROCEDURE — 2580000003 HC RX 258: Performed by: SURGERY

## 2019-08-04 PROCEDURE — 82271 OCCULT BLOOD OTHER SOURCES: CPT

## 2019-08-04 PROCEDURE — 2580000003 HC RX 258: Performed by: INTERNAL MEDICINE

## 2019-08-04 PROCEDURE — 85014 HEMATOCRIT: CPT

## 2019-08-04 PROCEDURE — 6360000002 HC RX W HCPCS: Performed by: INTERNAL MEDICINE

## 2019-08-04 PROCEDURE — 36415 COLL VENOUS BLD VENIPUNCTURE: CPT

## 2019-08-04 PROCEDURE — 2500000003 HC RX 250 WO HCPCS: Performed by: INTERNAL MEDICINE

## 2019-08-04 PROCEDURE — 85018 HEMOGLOBIN: CPT

## 2019-08-04 PROCEDURE — 99232 SBSQ HOSP IP/OBS MODERATE 35: CPT | Performed by: SURGERY

## 2019-08-04 PROCEDURE — 2060000000 HC ICU INTERMEDIATE R&B

## 2019-08-04 PROCEDURE — 6370000000 HC RX 637 (ALT 250 FOR IP): Performed by: INTERNAL MEDICINE

## 2019-08-04 PROCEDURE — C9113 INJ PANTOPRAZOLE SODIUM, VIA: HCPCS | Performed by: INTERNAL MEDICINE

## 2019-08-04 PROCEDURE — 74019 RADEX ABDOMEN 2 VIEWS: CPT

## 2019-08-04 PROCEDURE — 80048 BASIC METABOLIC PNL TOTAL CA: CPT

## 2019-08-04 RX ORDER — NICOTINE POLACRILEX 4 MG
15 LOZENGE BUCCAL PRN
Status: DISCONTINUED | OUTPATIENT
Start: 2019-08-04 | End: 2019-08-10 | Stop reason: HOSPADM

## 2019-08-04 RX ORDER — DEXTROSE MONOHYDRATE 25 G/50ML
12.5 INJECTION, SOLUTION INTRAVENOUS PRN
Status: DISCONTINUED | OUTPATIENT
Start: 2019-08-04 | End: 2019-08-10 | Stop reason: HOSPADM

## 2019-08-04 RX ORDER — DEXTROSE MONOHYDRATE 50 MG/ML
100 INJECTION, SOLUTION INTRAVENOUS PRN
Status: DISCONTINUED | OUTPATIENT
Start: 2019-08-04 | End: 2019-08-10 | Stop reason: HOSPADM

## 2019-08-04 RX ORDER — PANTOPRAZOLE SODIUM 40 MG/10ML
40 INJECTION, POWDER, LYOPHILIZED, FOR SOLUTION INTRAVENOUS 2 TIMES DAILY
Status: DISCONTINUED | OUTPATIENT
Start: 2019-08-04 | End: 2019-08-10 | Stop reason: HOSPADM

## 2019-08-04 RX ADMIN — FAMOTIDINE 20 MG: 10 INJECTION, SOLUTION INTRAVENOUS at 12:23

## 2019-08-04 RX ADMIN — HEPARIN SODIUM 5000 UNITS: 5000 INJECTION INTRAVENOUS; SUBCUTANEOUS at 04:59

## 2019-08-04 RX ADMIN — Medication 10 ML: at 21:23

## 2019-08-04 RX ADMIN — SODIUM CHLORIDE: 9 INJECTION, SOLUTION INTRAVENOUS at 17:49

## 2019-08-04 RX ADMIN — ONDANSETRON 4 MG: 2 INJECTION INTRAMUSCULAR; INTRAVENOUS at 17:48

## 2019-08-04 RX ADMIN — INSULIN LISPRO 2 UNITS: 100 INJECTION, SOLUTION INTRAVENOUS; SUBCUTANEOUS at 12:23

## 2019-08-04 RX ADMIN — SODIUM CHLORIDE: 9 INJECTION, SOLUTION INTRAVENOUS at 04:40

## 2019-08-04 RX ADMIN — METOPROLOL TARTRATE 12.5 MG: 25 TABLET ORAL at 14:35

## 2019-08-04 RX ADMIN — MORPHINE SULFATE 2 MG: 4 INJECTION INTRAVENOUS at 17:40

## 2019-08-04 RX ADMIN — INSULIN LISPRO 1 UNITS: 100 INJECTION, SOLUTION INTRAVENOUS; SUBCUTANEOUS at 21:26

## 2019-08-04 RX ADMIN — INSULIN LISPRO 2 UNITS: 100 INJECTION, SOLUTION INTRAVENOUS; SUBCUTANEOUS at 17:33

## 2019-08-04 RX ADMIN — METOPROLOL TARTRATE 12.5 MG: 25 TABLET ORAL at 21:23

## 2019-08-04 RX ADMIN — MORPHINE SULFATE 2 MG: 4 INJECTION INTRAVENOUS at 06:34

## 2019-08-04 RX ADMIN — PANTOPRAZOLE SODIUM 40 MG: 40 INJECTION, POWDER, LYOPHILIZED, FOR SOLUTION INTRAVENOUS at 21:23

## 2019-08-04 ASSESSMENT — PAIN DESCRIPTION - PAIN TYPE: TYPE: ACUTE PAIN

## 2019-08-04 ASSESSMENT — PAIN DESCRIPTION - FREQUENCY: FREQUENCY: CONTINUOUS

## 2019-08-04 ASSESSMENT — PAIN SCALES - GENERAL
PAINLEVEL_OUTOF10: 4
PAINLEVEL_OUTOF10: 5
PAINLEVEL_OUTOF10: 2
PAINLEVEL_OUTOF10: 0

## 2019-08-04 ASSESSMENT — PAIN DESCRIPTION - LOCATION: LOCATION: ABDOMEN

## 2019-08-04 ASSESSMENT — PAIN DESCRIPTION - DESCRIPTORS: DESCRIPTORS: CRAMPING

## 2019-08-04 ASSESSMENT — PAIN DESCRIPTION - ORIENTATION: ORIENTATION: RIGHT;LEFT;MID

## 2019-08-04 ASSESSMENT — PAIN DESCRIPTION - ONSET: ONSET: ON-GOING

## 2019-08-04 ASSESSMENT — PAIN DESCRIPTION - PROGRESSION: CLINICAL_PROGRESSION: GRADUALLY WORSENING

## 2019-08-04 ASSESSMENT — PAIN - FUNCTIONAL ASSESSMENT: PAIN_FUNCTIONAL_ASSESSMENT: PREVENTS OR INTERFERES SOME ACTIVE ACTIVITIES AND ADLS

## 2019-08-04 NOTE — PROGRESS NOTES
[facility]       Progress Note           Subjective/   70y.o. year old male who we are seeing in consultation for ALEXUS. No new issues. Urine output better. Still high NG output. No abdominal pain. Wants to eat. ROS:  No sob, abdominal pain. Objective/   GEN:  Chronically ill, BP (!) 141/71   Pulse 111   Temp 97.8 °F (36.6 °C) (Oral)   Resp 16   Ht 5' 8\" (1.727 m)   Wt 112 lb 12.8 oz (51.2 kg)   SpO2 96%   BMI 17.15 kg/m²   HEENT: non-icteric, no JVD  CV: S1, S2 without m/r/g; no edema  RESP: CTA B without w/r/r; breathing wnl  ABD: +bs, soft, nt, no hsm  SKIN: warm, no rashes    Data/  Recent Labs     08/02/19 0458 08/03/19  0458   WBC 2.9* 4.0   HGB 9.6* 9.9*   HCT 29.7* 30.8*   MCV 84.0 83.2    362     Recent Labs     08/01/19  0956 08/02/19 0458 08/03/19  0458   * 134* 136   K 4.0 4.1 3.6   CL 97* 97* 96*   CO2 23 22 20*   GLUCOSE 90 214* 238*   PHOS 3.1 2.9  --    MG  --  2.20  --    BUN 52* 53* 33*   CREATININE 3.4* 2.7* 1.3   LABGLOM 18* 24* 55*   GFRAA 22* 29* >60       Assessment/     Acute kidney injury  - Etiology: Pre-renal/ATN injury in setting of SBO, Hypotension. Continue to hold lisinopril. - Data: Peak serum creatinine 5.6 on admission, previously 1.0 from end of June 2019  UA with small blood, 100 protein, s.g. 1.025, 5-10 coarse granular casts  - Clinical: Improving with IVF's, non-oliguric. sr cr 5.6->1.3. Continue IV fluid. Continue to monitor renal function and volume status closely. Awaiting morning lab. Once NG tube is clamped and started PO intake then consider discontinuing IV fluid.     Left pleural effusion - moderate to large     Renal cell cancer s/p left nephrectomy     SBO: NG tube. Clinically better. As per primary.     Lung cancer with bone mets     Hyponatremia - better with IV fluid     Lactic acidosis - resolved

## 2019-08-04 NOTE — PROGRESS NOTES
Patient resting in chair with eyes closed. No distress noted. NG tube hooked to low wall suction. Will continue to monitor,call light within reach.

## 2019-08-04 NOTE — PROGRESS NOTES
4 Eyes Skin Assessment     The patient is being assess for   Shift Handoff    I agree that 2 RN's have performed a thorough Head to Toe Skin Assessment on the patient. ALL assessment sites listed below have been assessed. Areas assessed by both nurses:   [x]   Head, Face, and Ears   [x]   Shoulders, Back, and Chest, Abdomen  [x]   Arms, Elbows, and Hands   [x]   Coccyx, Sacrum, and Ischium  [x]   Legs, Feet, and Heels        Protective mepilex to sacrum for blanchable redness, skin intact. Scattered abrasions on BUE.    **SHARE this note so that the co-signing nurse is able to place an eSignature**    Co-signer eSignature: Electronically signed by Barak Luo RN on 8/4/19 at 7:38 AM    Does the Patient have Skin Breakdown?   No          Joel Prevention initiated: yes  Wound Care Orders initiated:  No      [facility] nurse consulted for Pressure Injury (Stage 3,4, Unstageable, DTI, NWPT, Complex wounds)and New or Established Ostomies:  NA    Primary Nurse eSignature: Electronically signed by Armand Espinoza RN on 8/4/19 at 7:15 AM

## 2019-08-04 NOTE — PROGRESS NOTES
Bedside report given to [facility]. Pt. denies further needs at this time. Call light is within reach.   Dez Meza RN

## 2019-08-04 NOTE — PROGRESS NOTES
Pt. Awake sitting up in chair. Pt. Assessment completed- see flow sheet. Pt. denies further needs at this time. Will continue to monitor. Call light is within reach.   Xiomara Jiménez RN

## 2019-08-05 LAB
ANION GAP SERPL CALCULATED.3IONS-SCNC: 21 MMOL/L (ref 3–16)
BLOOD CULTURE, ROUTINE: NORMAL
BUN BLDV-MCNC: 27 MG/DL (ref 7–20)
CALCIUM SERPL-MCNC: 8.2 MG/DL (ref 8.3–10.6)
CHLORIDE BLD-SCNC: 98 MMOL/L (ref 99–110)
CO2: 25 MMOL/L (ref 21–32)
CREAT SERPL-MCNC: 1 MG/DL (ref 0.8–1.3)
CULTURE, BLOOD 2: NORMAL
GFR AFRICAN AMERICAN: >60
GFR NON-AFRICAN AMERICAN: >60
GLUCOSE BLD-MCNC: 153 MG/DL (ref 70–99)
GLUCOSE BLD-MCNC: 179 MG/DL (ref 70–99)
GLUCOSE BLD-MCNC: 196 MG/DL (ref 70–99)
GLUCOSE BLD-MCNC: 200 MG/DL (ref 70–99)
GLUCOSE BLD-MCNC: 226 MG/DL (ref 70–99)
HCT VFR BLD CALC: 32.3 % (ref 40.5–52.5)
HCT VFR BLD CALC: 32.3 % (ref 40.5–52.5)
HEMOGLOBIN: 10.2 G/DL (ref 13.5–17.5)
HEMOGLOBIN: 10.3 G/DL (ref 13.5–17.5)
LACTIC ACID: 1.3 MMOL/L (ref 0.4–2)
MAGNESIUM: 2.1 MG/DL (ref 1.8–2.4)
MCH RBC QN AUTO: 26.8 PG (ref 26–34)
MCHC RBC AUTO-ENTMCNC: 31.9 G/DL (ref 31–36)
MCV RBC AUTO: 83.9 FL (ref 80–100)
PDW BLD-RTO: 17.4 % (ref 12.4–15.4)
PERFORMED ON: ABNORMAL
PHOSPHORUS: 1.2 MG/DL (ref 2.5–4.9)
PLATELET # BLD: 409 K/UL (ref 135–450)
PMV BLD AUTO: 8.1 FL (ref 5–10.5)
POTASSIUM SERPL-SCNC: 3.2 MMOL/L (ref 3.5–5.1)
RBC # BLD: 3.85 M/UL (ref 4.2–5.9)
SODIUM BLD-SCNC: 144 MMOL/L (ref 136–145)
URIC ACID, SERUM: 8 MG/DL (ref 3.5–7.2)
WBC # BLD: 7.3 K/UL (ref 4–11)

## 2019-08-05 PROCEDURE — 99232 SBSQ HOSP IP/OBS MODERATE 35: CPT | Performed by: SURGERY

## 2019-08-05 PROCEDURE — C9113 INJ PANTOPRAZOLE SODIUM, VIA: HCPCS | Performed by: INTERNAL MEDICINE

## 2019-08-05 PROCEDURE — 6370000000 HC RX 637 (ALT 250 FOR IP): Performed by: INTERNAL MEDICINE

## 2019-08-05 PROCEDURE — 2580000003 HC RX 258: Performed by: SURGERY

## 2019-08-05 PROCEDURE — 84550 ASSAY OF BLOOD/URIC ACID: CPT

## 2019-08-05 PROCEDURE — 85027 COMPLETE CBC AUTOMATED: CPT

## 2019-08-05 PROCEDURE — 85014 HEMATOCRIT: CPT

## 2019-08-05 PROCEDURE — 97530 THERAPEUTIC ACTIVITIES: CPT

## 2019-08-05 PROCEDURE — 2580000003 HC RX 258: Performed by: INTERNAL MEDICINE

## 2019-08-05 PROCEDURE — 36415 COLL VENOUS BLD VENIPUNCTURE: CPT

## 2019-08-05 PROCEDURE — 2060000000 HC ICU INTERMEDIATE R&B

## 2019-08-05 PROCEDURE — 6360000002 HC RX W HCPCS: Performed by: INTERNAL MEDICINE

## 2019-08-05 PROCEDURE — 6360000002 HC RX W HCPCS: Performed by: SURGERY

## 2019-08-05 PROCEDURE — 99232 SBSQ HOSP IP/OBS MODERATE 35: CPT | Performed by: INTERNAL MEDICINE

## 2019-08-05 PROCEDURE — 83605 ASSAY OF LACTIC ACID: CPT

## 2019-08-05 PROCEDURE — 97110 THERAPEUTIC EXERCISES: CPT

## 2019-08-05 PROCEDURE — 2500000003 HC RX 250 WO HCPCS: Performed by: SURGERY

## 2019-08-05 PROCEDURE — 83735 ASSAY OF MAGNESIUM: CPT

## 2019-08-05 PROCEDURE — 80048 BASIC METABOLIC PNL TOTAL CA: CPT

## 2019-08-05 PROCEDURE — 84100 ASSAY OF PHOSPHORUS: CPT

## 2019-08-05 PROCEDURE — 85018 HEMOGLOBIN: CPT

## 2019-08-05 RX ORDER — POTASSIUM CHLORIDE 7.45 MG/ML
10 INJECTION INTRAVENOUS
Status: COMPLETED | OUTPATIENT
Start: 2019-08-05 | End: 2019-08-05

## 2019-08-05 RX ORDER — SODIUM CHLORIDE, SODIUM LACTATE, POTASSIUM CHLORIDE, CALCIUM CHLORIDE 600; 310; 30; 20 MG/100ML; MG/100ML; MG/100ML; MG/100ML
INJECTION, SOLUTION INTRAVENOUS CONTINUOUS
Status: DISCONTINUED | OUTPATIENT
Start: 2019-08-05 | End: 2019-08-07

## 2019-08-05 RX ADMIN — INSULIN LISPRO 1 UNITS: 100 INJECTION, SOLUTION INTRAVENOUS; SUBCUTANEOUS at 08:25

## 2019-08-05 RX ADMIN — PANTOPRAZOLE SODIUM 40 MG: 40 INJECTION, POWDER, LYOPHILIZED, FOR SOLUTION INTRAVENOUS at 21:39

## 2019-08-05 RX ADMIN — POTASSIUM CHLORIDE 10 MEQ: 7.46 INJECTION, SOLUTION INTRAVENOUS at 08:15

## 2019-08-05 RX ADMIN — Medication 10 ML: at 21:39

## 2019-08-05 RX ADMIN — INSULIN LISPRO 2 UNITS: 100 INJECTION, SOLUTION INTRAVENOUS; SUBCUTANEOUS at 11:56

## 2019-08-05 RX ADMIN — SODIUM CHLORIDE, POTASSIUM CHLORIDE, SODIUM LACTATE AND CALCIUM CHLORIDE: 600; 310; 30; 20 INJECTION, SOLUTION INTRAVENOUS at 14:39

## 2019-08-05 RX ADMIN — INSULIN LISPRO 1 UNITS: 100 INJECTION, SOLUTION INTRAVENOUS; SUBCUTANEOUS at 17:29

## 2019-08-05 RX ADMIN — PANTOPRAZOLE SODIUM 40 MG: 40 INJECTION, POWDER, LYOPHILIZED, FOR SOLUTION INTRAVENOUS at 09:35

## 2019-08-05 RX ADMIN — METOPROLOL TARTRATE 25 MG: 25 TABLET ORAL at 21:39

## 2019-08-05 RX ADMIN — MORPHINE SULFATE 2 MG: 4 INJECTION INTRAVENOUS at 09:35

## 2019-08-05 RX ADMIN — Medication 10 ML: at 08:14

## 2019-08-05 RX ADMIN — POTASSIUM PHOSPHATE, MONOBASIC AND POTASSIUM PHOSPHATE, DIBASIC 30 MMOL: 224; 236 INJECTION, SOLUTION INTRAVENOUS at 10:32

## 2019-08-05 RX ADMIN — POTASSIUM CHLORIDE 10 MEQ: 7.46 INJECTION, SOLUTION INTRAVENOUS at 09:27

## 2019-08-05 RX ADMIN — METOPROLOL TARTRATE 12.5 MG: 25 TABLET ORAL at 08:15

## 2019-08-05 ASSESSMENT — PAIN DESCRIPTION - PROGRESSION: CLINICAL_PROGRESSION: GRADUALLY WORSENING

## 2019-08-05 ASSESSMENT — PAIN SCALES - GENERAL: PAINLEVEL_OUTOF10: 10

## 2019-08-05 ASSESSMENT — PAIN - FUNCTIONAL ASSESSMENT: PAIN_FUNCTIONAL_ASSESSMENT: PREVENTS OR INTERFERES WITH MANY ACTIVE NOT PASSIVE ACTIVITIES

## 2019-08-05 ASSESSMENT — PAIN DESCRIPTION - ORIENTATION: ORIENTATION: RIGHT;LEFT

## 2019-08-05 ASSESSMENT — PAIN DESCRIPTION - ONSET: ONSET: ON-GOING

## 2019-08-05 ASSESSMENT — PAIN DESCRIPTION - PAIN TYPE: TYPE: CHRONIC PAIN

## 2019-08-05 ASSESSMENT — PAIN DESCRIPTION - LOCATION: LOCATION: HIP;LEG

## 2019-08-05 ASSESSMENT — PAIN DESCRIPTION - FREQUENCY: FREQUENCY: CONTINUOUS

## 2019-08-05 ASSESSMENT — PAIN DESCRIPTION - DIRECTION: RADIATING_TOWARDS: DOWN LEG

## 2019-08-05 ASSESSMENT — PAIN DESCRIPTION - DESCRIPTORS: DESCRIPTORS: ACHING;DISCOMFORT

## 2019-08-05 NOTE — PROGRESS NOTES
Wound care nurse in to see pt. And take image of areas to buttocks. Skin continues to slough off with stage 2 under sloughing skin. Pt. Continues to refuse to get back to bed. States he is more comfortable in chair and he hurts too much in bed. Waffle cushion remains on seat and pt. Encouraged to switch position frequently. Pt. Cliff Clark. Pt. denies further needs at this time. Call light is within reach.   Whit Juan RN

## 2019-08-05 NOTE — PROGRESS NOTES
PCA called this RN and stated that pt. Is puking. Pt. Had about 50 ml dark brown/black emesis out. This RN hooked pt. Up to NG. Will continue to monitor.  Gertrude Robison RN

## 2019-08-05 NOTE — DISCHARGE SUMMARY
loops of   bowel within a large left hemidiaphragmatic focal defect with associated   engorgement of vessels and mesenteric edema. There is also twisting of   dilated small bowel loops in the abdominal cavity. Small-bowel obstruction   is likely related to entrapment within the diaphragmatic hernia and/or   internal hernia related to adhesions. Increased pleural and epicardial soft tissue masses. Increased extent of soft tissue mass associated with lytic L2 lesion,   encroaching on the spinal canal, probably more so than the prior study. Degree of stenosis of the spinal canal is difficult to determine,   particularly without intravenous contrast.  MRI is recommended to further   evaluate, possibly emergent, depending on the clinical circumstances. Findings were discussed with Esaw Crutch at approximately 8:56 p.m. on   7/31/2019. XR LUMBAR SPINE (2-3 VIEWS)   Final Result   Pathologic fracture L2, evidence for bony destruction of the posterior aspect   of the vertebral body extending into the pedicles. A lumbar spine CT or MRI   would be helpful for further evaluation         XR FEMUR LEFT (MIN 2 VIEWS)   Final Result   No acute osseous abnormality. XR CHEST PORTABLE   Final Result   Nodular density in the periphery of the left lung.   Correlate with recent CT   chest.      Elevation of the left hemidiaphragm      Old right-sided rib fractures      No acute abnormality otherwise             Disposition:  surgery for eval

## 2019-08-05 NOTE — PROGRESS NOTES
Spoke with Dr. Lia Sterling about LR order and not being compatible with potassium phosphate. Says okay to continue NS infusion until potassium phosphate infusion is complete.  Alejandro East RN

## 2019-08-05 NOTE — PLAN OF CARE
Problem: Risk for Impaired Skin Integrity  Goal: Tissue integrity - skin and mucous membranes  Description  Structural intactness and normal physiological function of skin and  mucous membranes. Outcome: Ongoing     Problem: Pain:  Description  Pain management should include both nonpharmacologic and pharmacologic interventions.   Goal: Pain level will decrease  Description  Pain level will decrease  Outcome: Ongoing  Goal: Control of acute pain  Description  Control of acute pain  Outcome: Ongoing  Goal: Control of chronic pain  Description  Control of chronic pain  Outcome: Ongoing     Problem: ABCDS Injury Assessment  Goal: Absence of physical injury  Outcome: Ongoing     Problem: Nutrition  Goal: Optimal nutrition therapy  Outcome: Ongoing  Goal: Understanding of nutritional guidelines  Outcome: Ongoing

## 2019-08-05 NOTE — FLOWSHEET NOTE
08/04/19 2016   Vital Signs   Temp 97.9 °F (36.6 °C)   Temp Source Oral   Pulse 106   Heart Rate Source Monitor   Resp 16   /73   Level of Consciousness 0   MEWS Score 2   Pain Assessment   Pain Level 0   Oxygen Therapy   SpO2 97 %   O2 Device None (Room air)   Vital signs stable. Pt is alert and oriented and denies any pain or nausea at the moment. Nothing new noted on head to toe assessment. NG remains secure in place at [facility]. NG clamped at this time. Will monitor for further nausea or vomiting. Pt is ST on the monitor. Evening medication administration completed. Pt denies any further assistance at the moment. Will continue to monitor.

## 2019-08-05 NOTE — PROGRESS NOTES
4 Eyes Skin Assessment     The patient is being assess for   Shift Handoff    I agree that 2 RN's have performed a thorough Head to Toe Skin Assessment on the patient. ALL assessment sites listed below have been assessed. Areas assessed by both nurses:   [x]   Head, Face, and Ears   [x]   Shoulders, Back, and Chest, Abdomen  [x]   Arms, Elbows, and Hands   [x]   Coccyx, Sacrum, and Ischium  [x]   Legs, Feet, and Heels        Skin tears Bilateral arms, Coccyx blanchable redness and sloughing skin, Meplilex in place, scattered scabs and bruising. **SHARE this note so that the co-signing nurse is able to place an eSignature**    Co-signer eSignature: Electronically signed by Maryan Whipple RN on 8/5/19 at 7:40 AM    Does the Patient have Skin Breakdown?  Joel Prevention initiated:  Yes   Wound Care Orders initiated:  Yes      [id number]  nurse consulted for Pressure Injury (Stage 3,4, Unstageable, DTI, NWPT, Complex wounds)and New or Established Ostomies:  NA      Primary Nurse eSignature: Electronically signed by Verner Rakes, RN on 8/5/19 at 7:19 AM

## 2019-08-06 ENCOUNTER — APPOINTMENT (OUTPATIENT)
Dept: GENERAL RADIOLOGY | Age: 64
DRG: 392 | End: 2019-08-06
Payer: MEDICARE

## 2019-08-06 LAB
ANION GAP SERPL CALCULATED.3IONS-SCNC: 19 MMOL/L (ref 3–16)
BUN BLDV-MCNC: 20 MG/DL (ref 7–20)
CALCIUM SERPL-MCNC: 7.9 MG/DL (ref 8.3–10.6)
CHLORIDE BLD-SCNC: 100 MMOL/L (ref 99–110)
CO2: 22 MMOL/L (ref 21–32)
CREAT SERPL-MCNC: 0.9 MG/DL (ref 0.8–1.3)
GFR AFRICAN AMERICAN: >60
GFR NON-AFRICAN AMERICAN: >60
GLUCOSE BLD-MCNC: 142 MG/DL (ref 70–99)
GLUCOSE BLD-MCNC: 146 MG/DL (ref 70–99)
GLUCOSE BLD-MCNC: 175 MG/DL (ref 70–99)
GLUCOSE BLD-MCNC: 186 MG/DL (ref 70–99)
GLUCOSE BLD-MCNC: 198 MG/DL (ref 70–99)
HCT VFR BLD CALC: 35.8 % (ref 40.5–52.5)
HEMOGLOBIN: 10.6 G/DL (ref 13.5–17.5)
PERFORMED ON: ABNORMAL
PHOSPHORUS: 1.6 MG/DL (ref 2.5–4.9)
POTASSIUM SERPL-SCNC: 3.6 MMOL/L (ref 3.5–5.1)
SODIUM BLD-SCNC: 141 MMOL/L (ref 136–145)

## 2019-08-06 PROCEDURE — 2580000003 HC RX 258: Performed by: INTERNAL MEDICINE

## 2019-08-06 PROCEDURE — 84100 ASSAY OF PHOSPHORUS: CPT

## 2019-08-06 PROCEDURE — 6370000000 HC RX 637 (ALT 250 FOR IP): Performed by: INTERNAL MEDICINE

## 2019-08-06 PROCEDURE — 6360000002 HC RX W HCPCS: Performed by: INTERNAL MEDICINE

## 2019-08-06 PROCEDURE — 36415 COLL VENOUS BLD VENIPUNCTURE: CPT

## 2019-08-06 PROCEDURE — C9113 INJ PANTOPRAZOLE SODIUM, VIA: HCPCS | Performed by: INTERNAL MEDICINE

## 2019-08-06 PROCEDURE — 2500000003 HC RX 250 WO HCPCS: Performed by: INTERNAL MEDICINE

## 2019-08-06 PROCEDURE — 74018 RADEX ABDOMEN 1 VIEW: CPT

## 2019-08-06 PROCEDURE — 80048 BASIC METABOLIC PNL TOTAL CA: CPT

## 2019-08-06 PROCEDURE — 2060000000 HC ICU INTERMEDIATE R&B

## 2019-08-06 PROCEDURE — 85018 HEMOGLOBIN: CPT

## 2019-08-06 PROCEDURE — 85014 HEMATOCRIT: CPT

## 2019-08-06 PROCEDURE — 99232 SBSQ HOSP IP/OBS MODERATE 35: CPT | Performed by: INTERNAL MEDICINE

## 2019-08-06 RX ADMIN — METOPROLOL TARTRATE 25 MG: 25 TABLET ORAL at 08:54

## 2019-08-06 RX ADMIN — MORPHINE SULFATE 2 MG: 4 INJECTION INTRAVENOUS at 17:49

## 2019-08-06 RX ADMIN — SODIUM CHLORIDE, POTASSIUM CHLORIDE, SODIUM LACTATE AND CALCIUM CHLORIDE: 600; 310; 30; 20 INJECTION, SOLUTION INTRAVENOUS at 11:47

## 2019-08-06 RX ADMIN — MORPHINE SULFATE 2 MG: 4 INJECTION INTRAVENOUS at 08:54

## 2019-08-06 RX ADMIN — INSULIN LISPRO 1 UNITS: 100 INJECTION, SOLUTION INTRAVENOUS; SUBCUTANEOUS at 08:55

## 2019-08-06 RX ADMIN — POTASSIUM PHOSPHATE, MONOBASIC AND POTASSIUM PHOSPHATE, DIBASIC 30 MMOL: 224; 236 INJECTION, SOLUTION INTRAVENOUS at 09:44

## 2019-08-06 RX ADMIN — METOPROLOL TARTRATE 25 MG: 25 TABLET ORAL at 21:48

## 2019-08-06 RX ADMIN — MORPHINE SULFATE 2 MG: 4 INJECTION INTRAVENOUS at 13:16

## 2019-08-06 RX ADMIN — PANTOPRAZOLE SODIUM 40 MG: 40 INJECTION, POWDER, LYOPHILIZED, FOR SOLUTION INTRAVENOUS at 08:54

## 2019-08-06 RX ADMIN — SODIUM PHOSPHATE, MONOBASIC, MONOHYDRATE 30 MMOL: 276; 142 INJECTION, SOLUTION INTRAVENOUS at 15:23

## 2019-08-06 RX ADMIN — PANTOPRAZOLE SODIUM 40 MG: 40 INJECTION, POWDER, LYOPHILIZED, FOR SOLUTION INTRAVENOUS at 21:48

## 2019-08-06 RX ADMIN — Medication 10 ML: at 08:54

## 2019-08-06 RX ADMIN — Medication 10 ML: at 21:48

## 2019-08-06 RX ADMIN — INSULIN LISPRO 1 UNITS: 100 INJECTION, SOLUTION INTRAVENOUS; SUBCUTANEOUS at 17:50

## 2019-08-06 RX ADMIN — MORPHINE SULFATE 2 MG: 4 INJECTION INTRAVENOUS at 23:24

## 2019-08-06 ASSESSMENT — PAIN DESCRIPTION - PAIN TYPE
TYPE: CHRONIC PAIN

## 2019-08-06 ASSESSMENT — PAIN DESCRIPTION - ONSET
ONSET: ON-GOING
ONSET: GRADUAL
ONSET: GRADUAL

## 2019-08-06 ASSESSMENT — PAIN DESCRIPTION - FREQUENCY
FREQUENCY: INTERMITTENT

## 2019-08-06 ASSESSMENT — PAIN SCALES - GENERAL
PAINLEVEL_OUTOF10: 0
PAINLEVEL_OUTOF10: 6
PAINLEVEL_OUTOF10: 4
PAINLEVEL_OUTOF10: 4
PAINLEVEL_OUTOF10: 7

## 2019-08-06 ASSESSMENT — PAIN DESCRIPTION - PROGRESSION
CLINICAL_PROGRESSION: GRADUALLY WORSENING

## 2019-08-06 ASSESSMENT — PAIN DESCRIPTION - ORIENTATION
ORIENTATION: RIGHT;LEFT

## 2019-08-06 ASSESSMENT — PAIN DESCRIPTION - DESCRIPTORS
DESCRIPTORS: ACHING;DISCOMFORT

## 2019-08-06 ASSESSMENT — PAIN - FUNCTIONAL ASSESSMENT
PAIN_FUNCTIONAL_ASSESSMENT: PREVENTS OR INTERFERES SOME ACTIVE ACTIVITIES AND ADLS

## 2019-08-06 ASSESSMENT — PAIN DESCRIPTION - LOCATION
LOCATION: LEG

## 2019-08-06 NOTE — FLOWSHEET NOTE
08/05/19 2059   Vital Signs   Temp 97.2 °F (36.2 °C)   Temp Source Oral   Pulse 111   Heart Rate Source Monitor   /75   MAP (mmHg) 91   Level of Consciousness 0   Oxygen Therapy   SpO2 99 %   O2 Device None (Room air)     Vital signs stable. Pt is alert and oriented and denies any pain or nausea at the moment. Nothing new noted on head to toe assessment. NG remains secure in place at [facility]. Will monitor for further nausea or vomiting. Pt is ST on the monitor. Evening medication administration completed. LR infusing at 50 ml/hr. Tele-sitter at bedside for Pt safety. Pt denies any further assistance at the moment. Will continue to monitor.

## 2019-08-06 NOTE — PROGRESS NOTES
medical management preferred. - He was managed with NG tube placement and NPO status, when he showed improvement his NG tube was clamped and he was started on liquid diet 8/5. Unfortunately he had further episodes of vomiting after taking liquids, NG tube was again placed to suction and he had 1300 mL out over a 3 hour period. Case was discussed with Dr. Александр Lemons who recommends transferring to [facility] for evaluation by the complex hernia team.  Unfortunately the CT surgeon will be out of town for the next week and the on call surgeon does not feel comfortable operating on this complex patient. Will need to discuss next plan with general surgery      #GI bleed  - NG output appeared dark and bright red blood was noted in commode on 8/4. checked gastric occult which was positive. Heparin was stopped. IV PPI added. GI was consulted who recommended monitoring. His h.h remained stable   - of note, he was admitted 2/2019 with Upper GIB- EGD showed severe ulcerative esophagitis with adherent clot s/p hemospray at that time      #ALEXUS  - in setting of SBO, suspected pre-renal cause  - Baseline creatinine 1.0. Crt was 5.6 on admission. Seen by nephrology, hydrated with IV fluids.  ACE inhibitor held. ALEXUS resolved with Crt 1.0 today      #Hypernatremia  #Status post hyponatremia  - resolved   - nephro managing IVF     #Hypertension  - Blood pressure heart rate trending up  - Lisinopril held due to ALEXUS  - added metoprolol- increased dose to 25 mg BID on 8/5/19     #DM2  - Lantus and oral rx held 2/2 NPO   - low-dose sliding scale insulin     #hypokalemia  #hypophosphatemia  - replaced today 8/5      #Renal Cell Carcinoma, stage 3  - s/p left nephrectomy 2017 at [facility]  - unclear details as patient is poor historian.   Hem/onc notes requested- show that he was started on opdivo 4/2019     #Lumber fracture  - Lytic lesions 2/2 metastases   - PRN pain control       #Debility   - consulted PT,OT- recommending SNF       #moderate protein calorie malnutrition  - nutrition consulted       DVT Prophylaxis:  Lovenox   Diet: Diet NPO Effective Now Exceptions are: Ice Chips, Sips with Meds  Code Status: Full Code    Audie Garcia PA-C    Continue NG suction  Call made to [facility] for transfer  Agree with above  Changes made to note    Alpesh Pastor MD

## 2019-08-06 NOTE — PROGRESS NOTES
08/06/19  0429   * 144 141   K 3.6 3.2* 3.6   CL 99 98* 100   CO2 25 25 22   PHOS  --  1.2* 1.6*   BUN 26* 27* 20   CREATININE 1.0 1.0 0.9     No results for input(s): CKTOTAL, CKMB, CKMBINDEX, TROPONINI in the last 72 hours. Coagulation:   Lab Results   Component Value Date    INR 0.97 02/14/2019    APTT 32.7 02/14/2019     Cardiac markers:   Lab Results   Component Value Date    TROPONINI 0.03 07/31/2019         Lab Results   Component Value Date    ALT 8 (L) 08/02/2019    AST 16 08/02/2019    ALKPHOS 73 08/02/2019    BILITOT 0.3 08/02/2019       Lab Results   Component Value Date    INR 0.97 02/14/2019    INR 1.00 02/09/2019    INR 1.03 02/27/2018    PROTIME 11.1 02/14/2019    PROTIME 11.4 02/09/2019    PROTIME 11.6 02/27/2018       Radiology    CULTURES  Blood: NGTD      RADIOLOGY  XR ABDOMEN (2 VIEWS)   Final Result   Findings remain compatible with small bowel obstruction. Fewer air-filled   dilated loops are seen within the central abdomen as compared to prior.           XR ABDOMEN (2 VIEWS)   Final Result   Mild progression of small bowel distention consistent with a mid to distal   obstruction. Enteric catheter in appropriate position.           XR ABDOMEN (2 VIEWS)   Final Result   NG tube tip and proximal side-port reside in the gastric fundus.       Dilated small bowel loops some of which extend into the left hemithorax seen   to better advantage on recent CT. The findings are consistent with a mid to   distal small bowel obstruction.           XR ABDOMEN (KUB) (SINGLE AP VIEW)   Final Result   Enteric tube projects within the stomach.           CT ABDOMEN PELVIS WO CONTRAST Additional Contrast? None   Final Result   The study is limited without intravenous contrast, particularly given motion   artifact. Evidence of small bowel obstruction. There are dilated loops of   bowel within a large left hemidiaphragmatic focal defect with associated   engorgement of vessels and mesenteric edema. There is also twisting of   dilated small bowel loops in the abdominal cavity. Small-bowel obstruction   is likely related to entrapment within the diaphragmatic hernia and/or   internal hernia related to adhesions.       Increased pleural and epicardial soft tissue masses.       Increased extent of soft tissue mass associated with lytic L2 lesion,   encroaching on the spinal canal, probably more so than the prior study. Degree of stenosis of the spinal canal is difficult to determine,   particularly without intravenous contrast.  MRI is recommended to further   evaluate, possibly emergent, depending on the clinical circumstances.       Findings were discussed with Odessa Ramirez at approximately 8:56 p.m. on   7/31/2019.           XR LUMBAR SPINE (2-3 VIEWS)   Final Result   Pathologic fracture L2, evidence for bony destruction of the posterior aspect   of the vertebral body extending into the pedicles. A lumbar spine CT or MRI   would be helpful for further evaluation           XR FEMUR LEFT (MIN 2 VIEWS)   Final Result   No acute osseous abnormality.           XR CHEST PORTABLE   Final Result   Nodular density in the periphery of the left lung.   Correlate with recent CT   chest.       Elevation of the left hemidiaphragm       Old right-sided rib fractures       No acute abnormality otherwise           XR ABDOMEN (KUB) (SINGLE AP VIEW)    (Results Pending)         Assessment/Plan:     #SBO  #Complex left diaphragmatic hernia  - Seen by general surgeon: he has a complex left diaphragmatic hernia, medical management preferred.   - He was managed with NG tube placement and NPO status, when he showed improvement his NG tube was clamped and he was started on liquid diet 8/5.  Unfortunately he had further episodes of vomiting after taking liquids, NG tube was again placed to suction and he had 1300 mL out over a 3 hour period. Sheila Forbes was discussed with Dr. Mckenzie Taylor who recommends transferring to [facility] for evaluation by the complex hernia team.  Unfortunately the CT surgeon will be out of town for the next week and the on call surgeon does not feel comfortable operating on this complex patient. Called  transfer center and surgery has accepted transfer   I have spoken to pt family regarding transfer and need for further evaluation  Surgery at [facility] will be deciding about further plan of care and he is not a ideal candidate for surgery and may not be doing well with this incident of SBO with stage 4 cancer and recent chemotherapy will hinder any healing process if he ends up with surgery  Family understands     #GI bleed  - NG output appeared dark and bright red blood was noted in commode on 8/4. checked gastric occult which was positive.  Heparin was stopped.   IV PPI added. Luca [place] was consulted who recommended monitoring.  His h.h remained stable   - of note, he was admitted 2/2019 with Upper GIB- EGD showed severe ulcerative esophagitis with adherent clot s/p hemospray at that time      #ALEXUS  - in setting of SBO, suspected pre-renal cause  - Baseline creatinine 1.0.  Crt was 5.6 on admission.  Seen by nephrology, hydrated with IV fluids.   ACE inhibitor held. Zuhair Points resolved with Crt 1.0 today      #Hypernatremia  #Status post hyponatremia  - resolved   - nephro managing IVF     #Hypertension  - Blood pressure heart rate trending up  - Lisinopril held due to ALEXUS  - added metoprolol- increased dose to 25 mg BID on 8/5/19     #DM2  - Lantus and oral rx held 2/2 NPO   - low-dose sliding scale insulin     #hypokalemia  #hypophosphatemia  - replaced today 8/5      #Renal Cell Carcinoma, stage 3    - s/p left nephrectomy 2017 at [facility]  - unclear details as patient is poor historian.  Hem/onc notes requested- show that he was started on opdivo 4/2019     #Lumber fracture  - Lytic lesions 2/2 metastases   - PRN pain control       #Debility   - consulted PT,OT- recommending SNF       #moderate protein calorie malnutrition  - nutrition consulted        DVT Prophylaxis:  Lovenox   Diet: Diet NPO Effective Now Exceptions are: Ice Chips, Sips with Meds  Code Status: Full Code      Dixon Barlow MD 8/6/2019 10:17 AM

## 2019-08-07 ENCOUNTER — APPOINTMENT (OUTPATIENT)
Dept: GENERAL RADIOLOGY | Age: 64
DRG: 392 | End: 2019-08-07
Payer: MEDICARE

## 2019-08-07 LAB
ANION GAP SERPL CALCULATED.3IONS-SCNC: 17 MMOL/L (ref 3–16)
BUN BLDV-MCNC: 13 MG/DL (ref 7–20)
CALCIUM SERPL-MCNC: 7.4 MG/DL (ref 8.3–10.6)
CHLORIDE BLD-SCNC: 102 MMOL/L (ref 99–110)
CO2: 25 MMOL/L (ref 21–32)
CREAT SERPL-MCNC: 0.7 MG/DL (ref 0.8–1.3)
GFR AFRICAN AMERICAN: >60
GFR NON-AFRICAN AMERICAN: >60
GLUCOSE BLD-MCNC: 138 MG/DL (ref 70–99)
GLUCOSE BLD-MCNC: 144 MG/DL (ref 70–99)
GLUCOSE BLD-MCNC: 170 MG/DL (ref 70–99)
GLUCOSE BLD-MCNC: 176 MG/DL (ref 70–99)
GLUCOSE BLD-MCNC: 184 MG/DL (ref 70–99)
GLUCOSE BLD-MCNC: 274 MG/DL (ref 70–99)
HCT VFR BLD CALC: 31.4 % (ref 40.5–52.5)
HEMOGLOBIN: 10 G/DL (ref 13.5–17.5)
INR BLD: 1.13 (ref 0.86–1.14)
PERFORMED ON: ABNORMAL
PHOSPHORUS: 2.8 MG/DL (ref 2.5–4.9)
POTASSIUM SERPL-SCNC: 3.4 MMOL/L (ref 3.5–5.1)
PROTHROMBIN TIME: 12.9 SEC (ref 9.8–13)
SODIUM BLD-SCNC: 144 MMOL/L (ref 136–145)

## 2019-08-07 PROCEDURE — 97110 THERAPEUTIC EXERCISES: CPT

## 2019-08-07 PROCEDURE — 84100 ASSAY OF PHOSPHORUS: CPT

## 2019-08-07 PROCEDURE — 36415 COLL VENOUS BLD VENIPUNCTURE: CPT

## 2019-08-07 PROCEDURE — 6360000002 HC RX W HCPCS: Performed by: INTERNAL MEDICINE

## 2019-08-07 PROCEDURE — 74018 RADEX ABDOMEN 1 VIEW: CPT

## 2019-08-07 PROCEDURE — 2580000003 HC RX 258: Performed by: INTERNAL MEDICINE

## 2019-08-07 PROCEDURE — 97530 THERAPEUTIC ACTIVITIES: CPT

## 2019-08-07 PROCEDURE — 6370000000 HC RX 637 (ALT 250 FOR IP): Performed by: INTERNAL MEDICINE

## 2019-08-07 PROCEDURE — 2500000003 HC RX 250 WO HCPCS: Performed by: INTERNAL MEDICINE

## 2019-08-07 PROCEDURE — 2060000000 HC ICU INTERMEDIATE R&B

## 2019-08-07 PROCEDURE — 80048 BASIC METABOLIC PNL TOTAL CA: CPT

## 2019-08-07 PROCEDURE — C9113 INJ PANTOPRAZOLE SODIUM, VIA: HCPCS | Performed by: INTERNAL MEDICINE

## 2019-08-07 PROCEDURE — 85610 PROTHROMBIN TIME: CPT

## 2019-08-07 PROCEDURE — 85014 HEMATOCRIT: CPT

## 2019-08-07 PROCEDURE — C1751 CATH, INF, PER/CENT/MIDLINE: HCPCS

## 2019-08-07 PROCEDURE — 99232 SBSQ HOSP IP/OBS MODERATE 35: CPT | Performed by: INTERNAL MEDICINE

## 2019-08-07 PROCEDURE — 85018 HEMOGLOBIN: CPT

## 2019-08-07 RX ORDER — DEXTROSE, SODIUM CHLORIDE, AND POTASSIUM CHLORIDE 5; .45; .15 G/100ML; G/100ML; G/100ML
INJECTION INTRAVENOUS CONTINUOUS
Status: DISCONTINUED | OUTPATIENT
Start: 2019-08-07 | End: 2019-08-08

## 2019-08-07 RX ORDER — SODIUM CHLORIDE 0.9 % (FLUSH) 0.9 %
10 SYRINGE (ML) INJECTION PRN
Status: DISCONTINUED | OUTPATIENT
Start: 2019-08-07 | End: 2019-08-10 | Stop reason: HOSPADM

## 2019-08-07 RX ORDER — SODIUM CHLORIDE 0.9 % (FLUSH) 0.9 %
10 SYRINGE (ML) INJECTION EVERY 12 HOURS SCHEDULED
Status: DISCONTINUED | OUTPATIENT
Start: 2019-08-07 | End: 2019-08-10 | Stop reason: HOSPADM

## 2019-08-07 RX ORDER — LIDOCAINE HYDROCHLORIDE 10 MG/ML
5 INJECTION, SOLUTION INFILTRATION; PERINEURAL ONCE
Status: DISCONTINUED | OUTPATIENT
Start: 2019-08-07 | End: 2019-08-08

## 2019-08-07 RX ORDER — MORPHINE SULFATE 4 MG/ML
2 INJECTION, SOLUTION INTRAMUSCULAR; INTRAVENOUS
Status: DISCONTINUED | OUTPATIENT
Start: 2019-08-07 | End: 2019-08-09

## 2019-08-07 RX ADMIN — INSULIN LISPRO 2 UNITS: 100 INJECTION, SOLUTION INTRAVENOUS; SUBCUTANEOUS at 21:15

## 2019-08-07 RX ADMIN — Medication 10 ML: at 21:03

## 2019-08-07 RX ADMIN — MORPHINE SULFATE 2 MG: 4 INJECTION INTRAVENOUS at 08:09

## 2019-08-07 RX ADMIN — METOPROLOL TARTRATE 25 MG: 25 TABLET ORAL at 21:03

## 2019-08-07 RX ADMIN — PANTOPRAZOLE SODIUM 40 MG: 40 INJECTION, POWDER, LYOPHILIZED, FOR SOLUTION INTRAVENOUS at 08:09

## 2019-08-07 RX ADMIN — SODIUM CHLORIDE, POTASSIUM CHLORIDE, SODIUM LACTATE AND CALCIUM CHLORIDE: 600; 310; 30; 20 INJECTION, SOLUTION INTRAVENOUS at 00:53

## 2019-08-07 RX ADMIN — POTASSIUM CHLORIDE, DEXTROSE MONOHYDRATE AND SODIUM CHLORIDE: 150; 5; 450 INJECTION, SOLUTION INTRAVENOUS at 10:37

## 2019-08-07 RX ADMIN — PANTOPRAZOLE SODIUM 40 MG: 40 INJECTION, POWDER, LYOPHILIZED, FOR SOLUTION INTRAVENOUS at 21:03

## 2019-08-07 RX ADMIN — ASCORBIC ACID, VITAMIN A PALMITATE, CHOLECALCIFEROL, THIAMINE HYDROCHLORIDE, RIBOFLAVIN-5 PHOSPHATE SODIUM, PYRIDOXINE HYDROCHLORIDE, NIACINAMIDE, DEXPANTHENOL, ALPHA-TOCOPHEROL ACETATE, VITAMIN K1, FOLIC ACID, BIOTIN, CYANOCOBALAMIN: 200; 3300; 200; 6; 3.6; 6; 40; 15; 10; 150; 600; 60; 5 INJECTION, SOLUTION INTRAVENOUS at 17:59

## 2019-08-07 RX ADMIN — METOPROLOL TARTRATE 25 MG: 25 TABLET ORAL at 08:10

## 2019-08-07 RX ADMIN — MORPHINE SULFATE 2 MG: 4 INJECTION INTRAVENOUS at 13:33

## 2019-08-07 RX ADMIN — MORPHINE SULFATE 2 MG: 4 INJECTION INTRAVENOUS at 22:08

## 2019-08-07 RX ADMIN — Medication 10 ML: at 08:15

## 2019-08-07 RX ADMIN — Medication 10 ML: at 21:04

## 2019-08-07 RX ADMIN — MORPHINE SULFATE 2 MG: 4 INJECTION INTRAVENOUS at 17:57

## 2019-08-07 ASSESSMENT — PAIN SCALES - GENERAL
PAINLEVEL_OUTOF10: 5
PAINLEVEL_OUTOF10: 10
PAINLEVEL_OUTOF10: 4
PAINLEVEL_OUTOF10: 2
PAINLEVEL_OUTOF10: 2
PAINLEVEL_OUTOF10: 0
PAINLEVEL_OUTOF10: 3
PAINLEVEL_OUTOF10: 4
PAINLEVEL_OUTOF10: 5

## 2019-08-07 ASSESSMENT — PAIN DESCRIPTION - PAIN TYPE
TYPE: CHRONIC PAIN

## 2019-08-07 ASSESSMENT — PAIN DESCRIPTION - DIRECTION
RADIATING_TOWARDS: NON
RADIATING_TOWARDS: NON

## 2019-08-07 ASSESSMENT — PAIN DESCRIPTION - LOCATION
LOCATION: GENERALIZED
LOCATION: HIP
LOCATION: GENERALIZED

## 2019-08-07 ASSESSMENT — PAIN DESCRIPTION - ORIENTATION
ORIENTATION: INNER
ORIENTATION: INNER
ORIENTATION: LEFT;RIGHT

## 2019-08-07 ASSESSMENT — PAIN DESCRIPTION - PROGRESSION
CLINICAL_PROGRESSION: GRADUALLY WORSENING

## 2019-08-07 ASSESSMENT — PAIN DESCRIPTION - FREQUENCY
FREQUENCY: CONTINUOUS

## 2019-08-07 ASSESSMENT — PAIN - FUNCTIONAL ASSESSMENT
PAIN_FUNCTIONAL_ASSESSMENT: PREVENTS OR INTERFERES SOME ACTIVE ACTIVITIES AND ADLS

## 2019-08-07 ASSESSMENT — PAIN DESCRIPTION - DESCRIPTORS
DESCRIPTORS: ACHING;CONSTANT;CRAMPING
DESCRIPTORS: ACHING;CONSTANT
DESCRIPTORS: ACHING

## 2019-08-07 ASSESSMENT — PAIN DESCRIPTION - ONSET
ONSET: GRADUAL
ONSET: ON-GOING

## 2019-08-07 NOTE — PROGRESS NOTES
IM Progress Note    Admit Date:  7/31/2019  7    Interval history:  Had emesis post initiation of liquid diet  Unable to be transferred to [facility] due to lack of thoracic surgery support     Awaiting bed at [facility] surgery dept    Subjective:  Mr. Julito Castle seen up in bed, reports frequent cramps to left leg   Has small BM but more like smear per staff  No vomiting      Objective:   BP (!) 151/67   Pulse 95   Temp 98.5 °F (36.9 °C) (Oral)   Resp 16   Ht 5' 8\" (1.727 m)   Wt 111 lb 8 oz (50.6 kg)   SpO2 97%   BMI 16.95 kg/m²       Intake/Output Summary (Last 24 hours) at 8/7/2019 3096  Last data filed at 8/7/2019 0645  Gross per 24 hour   Intake 1885 ml   Output 1300 ml   Net 585 ml       Physical Exam:        General: eldelrly frail male, up in bed   Awake, alert and oriented. Appears to be not in any distress  Mucous Membranes:  Pink , anicteric  NG in place   Neck: No JVD, no carotid bruit, no thyromegaly  Chest:  Clear to auscultation bilaterally, no added sounds  Cardiovascular:  RRR S1S2 heard, no murmurs or gallops  Abdomen:  Soft, undistended, non tender, no organomegaly, BS ++  Extremities: No edema or cyanosis.  Distal pulses well felt  Neurological : grossly normal with generalized weakness       Medications:   Scheduled Medications:    lidocaine 1 % injection  5 mL Intradermal Once    sodium chloride flush  10 mL Intravenous 2 times per day    metoprolol tartrate  25 mg Oral BID    insulin lispro  0-6 Units Subcutaneous TID WC    insulin lispro  0-3 Units Subcutaneous Nightly    pantoprazole  40 mg Intravenous BID    sodium chloride flush  10 mL Intravenous 2 times per day     I   dextrose 5% and 0.45% NaCl with KCl 20 mEq      dextrose       sodium chloride flush, glucose, dextrose, glucagon (rDNA), dextrose, sodium chloride flush, ondansetron, morphine, phenol, ondansetron    Lab Data:  Recent Labs     08/05/19  0500 08/06/19  0429 08/07/19  0506   WBC 7.3  --   --    HGB 10.3* 10.6* 10.0*

## 2019-08-07 NOTE — FLOWSHEET NOTE
08/06/19 2034   Vital Signs   Temp 98.4 °F (36.9 °C)   Temp Source Oral   Pulse 106   Heart Rate Source Monitor   Resp 16   BP (!) 161/83   MAP (mmHg) 109   Oxygen Therapy   SpO2 97 %   O2 Device None (Room air)   Vital signs stable. Pt is alert and oriented and denies any pain or nausea at the moment. Nothing new noted on head to toe assessment. NG remains secure in place at [facility]. Will monitor for further nausea or vomiting. Pt is ST on the monitor. Evening medication administration completed. LR infusing at 50 ml/hr. Tele-sitter at bedside for Pt safety. Pt denies any further assistance at the moment. Will continue to monitor.

## 2019-08-08 LAB
ANION GAP SERPL CALCULATED.3IONS-SCNC: 13 MMOL/L (ref 3–16)
BUN BLDV-MCNC: 12 MG/DL (ref 7–20)
CALCIUM SERPL-MCNC: 7.7 MG/DL (ref 8.3–10.6)
CHLORIDE BLD-SCNC: 100 MMOL/L (ref 99–110)
CO2: 26 MMOL/L (ref 21–32)
CREAT SERPL-MCNC: 0.7 MG/DL (ref 0.8–1.3)
GFR AFRICAN AMERICAN: >60
GFR NON-AFRICAN AMERICAN: >60
GLUCOSE BLD-MCNC: 225 MG/DL (ref 70–99)
GLUCOSE BLD-MCNC: 239 MG/DL (ref 70–99)
GLUCOSE BLD-MCNC: 267 MG/DL (ref 70–99)
GLUCOSE BLD-MCNC: 271 MG/DL (ref 70–99)
GLUCOSE BLD-MCNC: 331 MG/DL (ref 70–99)
GLUCOSE BLD-MCNC: 353 MG/DL (ref 70–99)
GLUCOSE BLD-MCNC: 373 MG/DL (ref 70–99)
GLUCOSE BLD-MCNC: 388 MG/DL (ref 70–99)
HCT VFR BLD CALC: 31.5 % (ref 40.5–52.5)
HEMOGLOBIN: 10 G/DL (ref 13.5–17.5)
PERFORMED ON: ABNORMAL
PHOSPHORUS: 1.4 MG/DL (ref 2.5–4.9)
POTASSIUM SERPL-SCNC: 3.8 MMOL/L (ref 3.5–5.1)
SODIUM BLD-SCNC: 139 MMOL/L (ref 136–145)

## 2019-08-08 PROCEDURE — 94761 N-INVAS EAR/PLS OXIMETRY MLT: CPT

## 2019-08-08 PROCEDURE — 99232 SBSQ HOSP IP/OBS MODERATE 35: CPT | Performed by: INTERNAL MEDICINE

## 2019-08-08 PROCEDURE — 6370000000 HC RX 637 (ALT 250 FOR IP): Performed by: INTERNAL MEDICINE

## 2019-08-08 PROCEDURE — 6360000002 HC RX W HCPCS: Performed by: INTERNAL MEDICINE

## 2019-08-08 PROCEDURE — 2500000003 HC RX 250 WO HCPCS: Performed by: INTERNAL MEDICINE

## 2019-08-08 PROCEDURE — 6370000000 HC RX 637 (ALT 250 FOR IP): Performed by: PHYSICIAN ASSISTANT

## 2019-08-08 PROCEDURE — 36415 COLL VENOUS BLD VENIPUNCTURE: CPT

## 2019-08-08 PROCEDURE — 85018 HEMOGLOBIN: CPT

## 2019-08-08 PROCEDURE — 80048 BASIC METABOLIC PNL TOTAL CA: CPT

## 2019-08-08 PROCEDURE — 2060000000 HC ICU INTERMEDIATE R&B

## 2019-08-08 PROCEDURE — C9113 INJ PANTOPRAZOLE SODIUM, VIA: HCPCS | Performed by: INTERNAL MEDICINE

## 2019-08-08 PROCEDURE — 84100 ASSAY OF PHOSPHORUS: CPT

## 2019-08-08 PROCEDURE — 85014 HEMATOCRIT: CPT

## 2019-08-08 PROCEDURE — 2580000003 HC RX 258: Performed by: INTERNAL MEDICINE

## 2019-08-08 PROCEDURE — 36573 INSJ PICC RS&I 5 YR+: CPT

## 2019-08-08 RX ORDER — INSULIN GLARGINE 100 [IU]/ML
10 INJECTION, SOLUTION SUBCUTANEOUS ONCE
Status: COMPLETED | OUTPATIENT
Start: 2019-08-08 | End: 2019-08-08

## 2019-08-08 RX ADMIN — PANTOPRAZOLE SODIUM 40 MG: 40 INJECTION, POWDER, LYOPHILIZED, FOR SOLUTION INTRAVENOUS at 08:12

## 2019-08-08 RX ADMIN — METOPROLOL TARTRATE 25 MG: 25 TABLET ORAL at 08:11

## 2019-08-08 RX ADMIN — INSULIN GLARGINE 10 UNITS: 100 INJECTION, SOLUTION SUBCUTANEOUS at 10:01

## 2019-08-08 RX ADMIN — I.V. FAT EMULSION 250 ML: 20 EMULSION INTRAVENOUS at 17:56

## 2019-08-08 RX ADMIN — INSULIN LISPRO 3 UNITS: 100 INJECTION, SOLUTION INTRAVENOUS; SUBCUTANEOUS at 12:58

## 2019-08-08 RX ADMIN — POTASSIUM CHLORIDE, DEXTROSE MONOHYDRATE AND SODIUM CHLORIDE: 150; 5; 450 INJECTION, SOLUTION INTRAVENOUS at 00:01

## 2019-08-08 RX ADMIN — Medication 10 ML: at 20:33

## 2019-08-08 RX ADMIN — MORPHINE SULFATE 2 MG: 4 INJECTION INTRAVENOUS at 16:41

## 2019-08-08 RX ADMIN — METOPROLOL TARTRATE 25 MG: 25 TABLET ORAL at 20:33

## 2019-08-08 RX ADMIN — MORPHINE SULFATE 2 MG: 4 INJECTION INTRAVENOUS at 20:33

## 2019-08-08 RX ADMIN — Medication 10 ML: at 08:12

## 2019-08-08 RX ADMIN — ASCORBIC ACID, VITAMIN A PALMITATE, CHOLECALCIFEROL, THIAMINE HYDROCHLORIDE, RIBOFLAVIN-5 PHOSPHATE SODIUM, PYRIDOXINE HYDROCHLORIDE, NIACINAMIDE, DEXPANTHENOL, ALPHA-TOCOPHEROL ACETATE, VITAMIN K1, FOLIC ACID, BIOTIN, CYANOCOBALAMIN: 200; 3300; 200; 6; 3.6; 6; 40; 15; 10; 150; 600; 60; 5 INJECTION, SOLUTION INTRAVENOUS at 05:28

## 2019-08-08 RX ADMIN — INSULIN LISPRO 8 UNITS: 100 INJECTION, SOLUTION INTRAVENOUS; SUBCUTANEOUS at 17:56

## 2019-08-08 RX ADMIN — INSULIN LISPRO 2 UNITS: 100 INJECTION, SOLUTION INTRAVENOUS; SUBCUTANEOUS at 20:35

## 2019-08-08 RX ADMIN — INSULIN LISPRO 5 UNITS: 100 INJECTION, SOLUTION INTRAVENOUS; SUBCUTANEOUS at 08:11

## 2019-08-08 RX ADMIN — MORPHINE SULFATE 2 MG: 4 INJECTION INTRAVENOUS at 01:49

## 2019-08-08 RX ADMIN — ONDANSETRON 4 MG: 2 INJECTION INTRAMUSCULAR; INTRAVENOUS at 11:36

## 2019-08-08 RX ADMIN — POTASSIUM PHOSPHATE, MONOBASIC AND POTASSIUM PHOSPHATE, DIBASIC 15 MMOL: 224; 236 INJECTION, SOLUTION INTRAVENOUS at 11:36

## 2019-08-08 RX ADMIN — MORPHINE SULFATE 2 MG: 4 INJECTION INTRAVENOUS at 08:11

## 2019-08-08 RX ADMIN — PANTOPRAZOLE SODIUM 40 MG: 40 INJECTION, POWDER, LYOPHILIZED, FOR SOLUTION INTRAVENOUS at 20:34

## 2019-08-08 RX ADMIN — ASCORBIC ACID, VITAMIN A PALMITATE, CHOLECALCIFEROL, THIAMINE HYDROCHLORIDE, RIBOFLAVIN-5 PHOSPHATE SODIUM, PYRIDOXINE HYDROCHLORIDE, NIACINAMIDE, DEXPANTHENOL, ALPHA-TOCOPHEROL ACETATE, VITAMIN K1, FOLIC ACID, BIOTIN, CYANOCOBALAMIN: 200; 3300; 200; 6; 3.6; 6; 40; 15; 10; 150; 600; 60; 5 INJECTION, SOLUTION INTRAVENOUS at 17:56

## 2019-08-08 RX ADMIN — Medication 10 ML: at 20:34

## 2019-08-08 ASSESSMENT — PAIN DESCRIPTION - DIRECTION
RADIATING_TOWARDS: DOWN LEFT LEG
RADIATING_TOWARDS: DOWN LEFT LEG
RADIATING_TOWARDS: DOWN LEG

## 2019-08-08 ASSESSMENT — PAIN SCALES - GENERAL
PAINLEVEL_OUTOF10: 5
PAINLEVEL_OUTOF10: 4
PAINLEVEL_OUTOF10: 10
PAINLEVEL_OUTOF10: 4
PAINLEVEL_OUTOF10: 1
PAINLEVEL_OUTOF10: 1

## 2019-08-08 ASSESSMENT — PAIN DESCRIPTION - ORIENTATION
ORIENTATION: LEFT
ORIENTATION: RIGHT;LEFT

## 2019-08-08 ASSESSMENT — PAIN DESCRIPTION - ONSET
ONSET: ON-GOING

## 2019-08-08 ASSESSMENT — PAIN DESCRIPTION - PAIN TYPE
TYPE: CHRONIC PAIN

## 2019-08-08 ASSESSMENT — PAIN DESCRIPTION - DESCRIPTORS
DESCRIPTORS: ACHING;CONSTANT
DESCRIPTORS: ACHING;CONSTANT
DESCRIPTORS: ACHING;CONSTANT;DISCOMFORT
DESCRIPTORS: ACHING;CONSTANT

## 2019-08-08 ASSESSMENT — PAIN - FUNCTIONAL ASSESSMENT
PAIN_FUNCTIONAL_ASSESSMENT: PREVENTS OR INTERFERES SOME ACTIVE ACTIVITIES AND ADLS

## 2019-08-08 ASSESSMENT — PAIN DESCRIPTION - FREQUENCY
FREQUENCY: CONTINUOUS

## 2019-08-08 ASSESSMENT — PAIN DESCRIPTION - LOCATION
LOCATION: HEAD;LEG
LOCATION: HIP;LEG
LOCATION: HIP
LOCATION: HIP;LEG;BACK

## 2019-08-08 ASSESSMENT — PAIN DESCRIPTION - PROGRESSION
CLINICAL_PROGRESSION: GRADUALLY WORSENING

## 2019-08-08 NOTE — PROGRESS NOTES
CONTRAST Additional Contrast? None   Final Result   The study is limited without intravenous contrast, particularly given motion   artifact. Evidence of small bowel obstruction. There are dilated loops of   bowel within a large left hemidiaphragmatic focal defect with associated   engorgement of vessels and mesenteric edema. There is also twisting of   dilated small bowel loops in the abdominal cavity. Small-bowel obstruction   is likely related to entrapment within the diaphragmatic hernia and/or   internal hernia related to adhesions.       Increased pleural and epicardial soft tissue masses.       Increased extent of soft tissue mass associated with lytic L2 lesion,   encroaching on the spinal canal, probably more so than the prior study. Degree of stenosis of the spinal canal is difficult to determine,   particularly without intravenous contrast.  MRI is recommended to further   evaluate, possibly emergent, depending on the clinical circumstances.       Findings were discussed with Carmen Fountain at approximately 8:56 p.m. on   7/31/2019.           XR LUMBAR SPINE (2-3 VIEWS)   Final Result   Pathologic fracture L2, evidence for bony destruction of the posterior aspect   of the vertebral body extending into the pedicles. A lumbar spine CT or MRI   would be helpful for further evaluation           XR FEMUR LEFT (MIN 2 VIEWS)   Final Result   No acute osseous abnormality.           XR CHEST PORTABLE   Final Result   Nodular density in the periphery of the left lung.   Correlate with recent CT   chest.       Elevation of the left hemidiaphragm       Old right-sided rib fractures       No acute abnormality otherwise         AXR  Worsening partial small bowel obstruction.     Assessment/Plan:     #SBO  #Complex left diaphragmatic hernia  - Seen by general surgeon: he has a complex left diaphragmatic hernia,    - He was managed with NG tube placement and NPO status, when he showed improvement his NG tube was clamped and he was started on liquid diet 8/5.  Unfortunately he had further episodes of vomiting after taking liquids, NG tube was again placed to suction    Case was discussed with Dr. Isela Rao who recommends transferring to [facility] for evaluation by the complex hernia team.  Unfortunately the CT surgeon will be out of town for the next week and the on call surgeon does not feel comfortable operating on this complex patient without ct surgery support     Called  transfer center and surgery has accepted transfer   I have spoken to pt family regarding transfer and need for further evaluation  Surgery at [facility] will be deciding about further plan of care and he is not a ideal candidate for surgery and may not be doing well with this incident of SBO with stage 4 cancer and recent chemotherapy will hinder any healing process if he ends up with surgery  Family understands    Due to delay in transfer and 1 week into admission with no diet and no progression in SBO, initiated TPN on 8/7 for nutrition       #GI bleed  - NG output appeared dark and bright red blood was noted in commode on 8/4. checked gastric occult which was positive.  Heparin was stopped.   IV PPI added. Liudmila Pino was consulted who recommended monitoring.  His h.h remained stable   - of note, he was admitted 2/2019 with Upper GIB- EGD showed severe ulcerative esophagitis with adherent clot s/p hemospray at that time      #ALEXUS  - in setting of SBO, suspected pre-renal cause  - Baseline creatinine 1.0.  Crt was 5.6 on admission.  Seen by nephrology, hydrated with IV fluids.   ACE inhibitor held. Edrie Shave resolved with Crt 0.7     #Hypernatremia  #Status post hyponatremia  - resolved   - nephro managing IVF     #Hypertension  - Blood pressure heart rate trending up  - Lisinopril held due to ALEXUS  - added metoprolol- increased dose to 25 mg BID on 8/5/19     #DM2  - Lantus and oral rx held 2/2 NPO   - low-dose sliding scale insulin     #hypokalemia  #hypophosphatemia  - replaced today 8/5      #Renal Cell Carcinoma, stage 4    - s/p left nephrectomy 2017 at [facility]  - unclear details as patient is poor historian.  Hem/onc notes requested- show that he was started on opdivo 4/2019     #Lumber fracture  - Lytic lesions 2/2 metastases   - PRN pain control       #Debility   - consulted PT,OT- recommending SNF after acute issues resolve       #moderate protein calorie malnutrition  - nutrition consulted        DVT Prophylaxis:  Lovenox   Diet: Diet NPO Effective Now Exceptions are: Ice Chips, Sips with Meds   TPN  Code Status: Full Code    F/w transfer center      Tanvir Benitez MD 8/8/2019 8:03 AM

## 2019-08-08 NOTE — PROGRESS NOTES
Occupational Therapy/Physical Therapy  Attempted OT treatment and the pts nurse stated the pt was having nausea and not feeling well so to hold OT at this time. Will attempt again later as time allows.   Wilmer Pandey OTR/L [address] [facility], [address] [place] [address], DPT #[id number]

## 2019-08-08 NOTE — PROGRESS NOTES
Pt. Complains of nausea. PRN Zofran given per [facility] order. Pt. denies further needs at this time. Will continue to monitor. Call light is within reach.   Herrera Garcia RN

## 2019-08-08 NOTE — PROGRESS NOTES
Patient complaining of hip pain 10/10. Patient moving around in bed moaning. PRN morphine given per MD order and ice pack applied. Will continue to monitor,call light within reach.

## 2019-08-08 NOTE — PROGRESS NOTES
Patient resting in bed awake. New bag of fluids hung running at 75ml/hr. BS check 267. Patient denies any needs at this time,will continue to monitor,call light within reach.

## 2019-08-09 ENCOUNTER — APPOINTMENT (OUTPATIENT)
Dept: GENERAL RADIOLOGY | Age: 64
DRG: 392 | End: 2019-08-09
Payer: MEDICARE

## 2019-08-09 LAB
ANION GAP SERPL CALCULATED.3IONS-SCNC: 10 MMOL/L (ref 3–16)
BUN BLDV-MCNC: 16 MG/DL (ref 7–20)
CALCIUM SERPL-MCNC: 7.5 MG/DL (ref 8.3–10.6)
CHLORIDE BLD-SCNC: 100 MMOL/L (ref 99–110)
CO2: 25 MMOL/L (ref 21–32)
CREAT SERPL-MCNC: 0.7 MG/DL (ref 0.8–1.3)
GFR AFRICAN AMERICAN: >60
GFR NON-AFRICAN AMERICAN: >60
GLUCOSE BLD-MCNC: 163 MG/DL (ref 70–99)
GLUCOSE BLD-MCNC: 166 MG/DL (ref 70–99)
GLUCOSE BLD-MCNC: 205 MG/DL (ref 70–99)
GLUCOSE BLD-MCNC: 216 MG/DL (ref 70–99)
GLUCOSE BLD-MCNC: 316 MG/DL (ref 70–99)
GLUCOSE BLD-MCNC: 324 MG/DL (ref 70–99)
GLUCOSE BLD-MCNC: 341 MG/DL (ref 70–99)
HCT VFR BLD CALC: 28.5 % (ref 40.5–52.5)
HEMOGLOBIN: 9.2 G/DL (ref 13.5–17.5)
PERFORMED ON: ABNORMAL
PHOSPHORUS: 2 MG/DL (ref 2.5–4.9)
POTASSIUM SERPL-SCNC: 4.1 MMOL/L (ref 3.5–5.1)
SODIUM BLD-SCNC: 135 MMOL/L (ref 136–145)
TRIGL SERPL-MCNC: 137 MG/DL (ref 0–150)

## 2019-08-09 PROCEDURE — 71045 X-RAY EXAM CHEST 1 VIEW: CPT

## 2019-08-09 PROCEDURE — 2580000003 HC RX 258: Performed by: INTERNAL MEDICINE

## 2019-08-09 PROCEDURE — 85014 HEMATOCRIT: CPT

## 2019-08-09 PROCEDURE — C9113 INJ PANTOPRAZOLE SODIUM, VIA: HCPCS | Performed by: INTERNAL MEDICINE

## 2019-08-09 PROCEDURE — 6360000002 HC RX W HCPCS: Performed by: INTERNAL MEDICINE

## 2019-08-09 PROCEDURE — 6370000000 HC RX 637 (ALT 250 FOR IP): Performed by: PHYSICIAN ASSISTANT

## 2019-08-09 PROCEDURE — 80048 BASIC METABOLIC PNL TOTAL CA: CPT

## 2019-08-09 PROCEDURE — 2500000003 HC RX 250 WO HCPCS: Performed by: INTERNAL MEDICINE

## 2019-08-09 PROCEDURE — 84478 ASSAY OF TRIGLYCERIDES: CPT

## 2019-08-09 PROCEDURE — 36415 COLL VENOUS BLD VENIPUNCTURE: CPT

## 2019-08-09 PROCEDURE — 2060000000 HC ICU INTERMEDIATE R&B

## 2019-08-09 PROCEDURE — 6370000000 HC RX 637 (ALT 250 FOR IP): Performed by: INTERNAL MEDICINE

## 2019-08-09 PROCEDURE — 84100 ASSAY OF PHOSPHORUS: CPT

## 2019-08-09 PROCEDURE — 85018 HEMOGLOBIN: CPT

## 2019-08-09 PROCEDURE — 99232 SBSQ HOSP IP/OBS MODERATE 35: CPT | Performed by: INTERNAL MEDICINE

## 2019-08-09 PROCEDURE — 74018 RADEX ABDOMEN 1 VIEW: CPT

## 2019-08-09 RX ORDER — INSULIN GLARGINE 100 [IU]/ML
10 INJECTION, SOLUTION SUBCUTANEOUS ONCE
Status: COMPLETED | OUTPATIENT
Start: 2019-08-09 | End: 2019-08-09

## 2019-08-09 RX ORDER — DIMETHICONE, OXYBENZONE, AND PADIMATE O 2; 2.5; 6.6 G/100G; G/100G; G/100G
STICK TOPICAL PRN
Status: DISCONTINUED | OUTPATIENT
Start: 2019-08-09 | End: 2019-08-10 | Stop reason: HOSPADM

## 2019-08-09 RX ORDER — HYDROMORPHONE HCL 110MG/55ML
0.5 PATIENT CONTROLLED ANALGESIA SYRINGE INTRAVENOUS
Status: DISCONTINUED | OUTPATIENT
Start: 2019-08-09 | End: 2019-08-10 | Stop reason: HOSPADM

## 2019-08-09 RX ADMIN — Medication 10 ML: at 19:52

## 2019-08-09 RX ADMIN — INSULIN LISPRO 2 UNITS: 100 INJECTION, SOLUTION INTRAVENOUS; SUBCUTANEOUS at 20:00

## 2019-08-09 RX ADMIN — METOPROLOL TARTRATE 25 MG: 25 TABLET ORAL at 19:55

## 2019-08-09 RX ADMIN — INSULIN LISPRO 2 UNITS: 100 INJECTION, SOLUTION INTRAVENOUS; SUBCUTANEOUS at 16:49

## 2019-08-09 RX ADMIN — INSULIN LISPRO 2 UNITS: 100 INJECTION, SOLUTION INTRAVENOUS; SUBCUTANEOUS at 11:11

## 2019-08-09 RX ADMIN — Medication 10 ML: at 08:21

## 2019-08-09 RX ADMIN — PANTOPRAZOLE SODIUM 40 MG: 40 INJECTION, POWDER, LYOPHILIZED, FOR SOLUTION INTRAVENOUS at 08:21

## 2019-08-09 RX ADMIN — INSULIN LISPRO 8 UNITS: 100 INJECTION, SOLUTION INTRAVENOUS; SUBCUTANEOUS at 08:28

## 2019-08-09 RX ADMIN — ASCORBIC ACID, VITAMIN A PALMITATE, CHOLECALCIFEROL, THIAMINE HYDROCHLORIDE, RIBOFLAVIN-5 PHOSPHATE SODIUM, PYRIDOXINE HYDROCHLORIDE, NIACINAMIDE, DEXPANTHENOL, ALPHA-TOCOPHEROL ACETATE, VITAMIN K1, FOLIC ACID, BIOTIN, CYANOCOBALAMIN: 200; 3300; 200; 6; 3.6; 6; 40; 15; 10; 150; 600; 60; 5 INJECTION, SOLUTION INTRAVENOUS at 06:14

## 2019-08-09 RX ADMIN — Medication: at 09:13

## 2019-08-09 RX ADMIN — PANTOPRAZOLE SODIUM 40 MG: 40 INJECTION, POWDER, LYOPHILIZED, FOR SOLUTION INTRAVENOUS at 19:52

## 2019-08-09 RX ADMIN — MORPHINE SULFATE 2 MG: 4 INJECTION INTRAVENOUS at 08:22

## 2019-08-09 RX ADMIN — MORPHINE SULFATE 2 MG: 4 INJECTION INTRAVENOUS at 05:55

## 2019-08-09 RX ADMIN — ASCORBIC ACID, VITAMIN A PALMITATE, CHOLECALCIFEROL, THIAMINE HYDROCHLORIDE, RIBOFLAVIN-5 PHOSPHATE SODIUM, PYRIDOXINE HYDROCHLORIDE, NIACINAMIDE, DEXPANTHENOL, ALPHA-TOCOPHEROL ACETATE, VITAMIN K1, FOLIC ACID, BIOTIN, CYANOCOBALAMIN: 200; 3300; 200; 6; 3.6; 6; 40; 15; 10; 150; 600; 60; 5 INJECTION, SOLUTION INTRAVENOUS at 19:29

## 2019-08-09 RX ADMIN — INSULIN GLARGINE 10 UNITS: 100 INJECTION, SOLUTION SUBCUTANEOUS at 09:13

## 2019-08-09 RX ADMIN — HYDROMORPHONE HYDROCHLORIDE 0.5 MG: 2 INJECTION, SOLUTION INTRAMUSCULAR; INTRAVENOUS; SUBCUTANEOUS at 23:12

## 2019-08-09 RX ADMIN — HYDROMORPHONE HYDROCHLORIDE 0.5 MG: 2 INJECTION, SOLUTION INTRAMUSCULAR; INTRAVENOUS; SUBCUTANEOUS at 19:50

## 2019-08-09 RX ADMIN — HYDROMORPHONE HYDROCHLORIDE 0.5 MG: 2 INJECTION, SOLUTION INTRAMUSCULAR; INTRAVENOUS; SUBCUTANEOUS at 15:59

## 2019-08-09 RX ADMIN — METOPROLOL TARTRATE 25 MG: 25 TABLET ORAL at 08:21

## 2019-08-09 RX ADMIN — MORPHINE SULFATE 2 MG: 4 INJECTION INTRAVENOUS at 11:59

## 2019-08-09 ASSESSMENT — PAIN SCALES - GENERAL
PAINLEVEL_OUTOF10: 8
PAINLEVEL_OUTOF10: 4
PAINLEVEL_OUTOF10: 5
PAINLEVEL_OUTOF10: 4
PAINLEVEL_OUTOF10: 10
PAINLEVEL_OUTOF10: 5

## 2019-08-09 ASSESSMENT — PAIN DESCRIPTION - PROGRESSION
CLINICAL_PROGRESSION: NOT CHANGED
CLINICAL_PROGRESSION: GRADUALLY WORSENING

## 2019-08-09 ASSESSMENT — PAIN DESCRIPTION - FREQUENCY
FREQUENCY: CONTINUOUS
FREQUENCY: CONTINUOUS
FREQUENCY: INTERMITTENT
FREQUENCY: CONTINUOUS

## 2019-08-09 ASSESSMENT — PAIN - FUNCTIONAL ASSESSMENT
PAIN_FUNCTIONAL_ASSESSMENT: PREVENTS OR INTERFERES WITH ALL ACTIVE AND SOME PASSIVE ACTIVITIES
PAIN_FUNCTIONAL_ASSESSMENT: PREVENTS OR INTERFERES SOME ACTIVE ACTIVITIES AND ADLS
PAIN_FUNCTIONAL_ASSESSMENT: PREVENTS OR INTERFERES SOME ACTIVE ACTIVITIES AND ADLS
PAIN_FUNCTIONAL_ASSESSMENT: PREVENTS OR INTERFERES WITH ALL ACTIVE AND SOME PASSIVE ACTIVITIES

## 2019-08-09 ASSESSMENT — PAIN DESCRIPTION - DESCRIPTORS
DESCRIPTORS: SORE;SPASM
DESCRIPTORS: CRAMPING;PENETRATING
DESCRIPTORS: PENETRATING;CRAMPING
DESCRIPTORS: ACHING;CONSTANT
DESCRIPTORS: DISCOMFORT
DESCRIPTORS: SORE;SPASM

## 2019-08-09 ASSESSMENT — PAIN DESCRIPTION - ORIENTATION
ORIENTATION: LEFT

## 2019-08-09 ASSESSMENT — PAIN DESCRIPTION - ONSET
ONSET: ON-GOING

## 2019-08-09 ASSESSMENT — PAIN DESCRIPTION - PAIN TYPE
TYPE: CHRONIC PAIN
TYPE: CHRONIC PAIN
TYPE: ACUTE PAIN
TYPE: CHRONIC PAIN

## 2019-08-09 ASSESSMENT — PAIN DESCRIPTION - LOCATION
LOCATION: HIP
LOCATION: HIP;LEG
LOCATION: HIP
LOCATION: LEG

## 2019-08-09 ASSESSMENT — PAIN DESCRIPTION - DIRECTION: RADIATING_TOWARDS: DOWN LEG

## 2019-08-09 NOTE — PROGRESS NOTES
Order placed by Dr. Juan Manuel Cavazos for TPN is via peripheral line. Message sent to Dr. Cortez Ceja (dunia REDDY) to address, as patient has single lumen PICC for TPN. Patient received fat emulsion 8/9/19 (protocol is twice weekly).

## 2019-08-09 NOTE — PROGRESS NOTES
Notified Dr. Abraham Bruner per pharmacy that TPN order has not been received to be made this evening.

## 2019-08-09 NOTE — PROGRESS NOTES
Advanced NG to right nare 15 cm per recommendations from KUB (and verified by Dr. Arianna Ramirez). Stat chest xray ordered to verify placement. Relayed KUB result to Dr. Arianna Ramirez at this time. Repositioned patient to left side utilizing pillow support. Patient continues to have severe leg cramps (left greater than right). Bed exercising has not relieved pain.  Will notify MD.

## 2019-08-09 NOTE — PROGRESS NOTES
Complete bed bath, linen change, gown change, and oral care provided. Repositioned patient utilizing pillow support, and mepilex's placed to blanchable erythema to bilateral heels.

## 2019-08-10 VITALS
SYSTOLIC BLOOD PRESSURE: 145 MMHG | DIASTOLIC BLOOD PRESSURE: 85 MMHG | HEART RATE: 108 BPM | OXYGEN SATURATION: 98 % | TEMPERATURE: 98.3 F | RESPIRATION RATE: 20 BRPM | HEIGHT: 68 IN | BODY MASS INDEX: 17.73 KG/M2 | WEIGHT: 117 LBS

## 2019-08-10 LAB
ANION GAP SERPL CALCULATED.3IONS-SCNC: 11 MMOL/L (ref 3–16)
BUN BLDV-MCNC: 20 MG/DL (ref 7–20)
CALCIUM SERPL-MCNC: 8.4 MG/DL (ref 8.3–10.6)
CHLORIDE BLD-SCNC: 98 MMOL/L (ref 99–110)
CO2: 24 MMOL/L (ref 21–32)
CREAT SERPL-MCNC: 0.7 MG/DL (ref 0.8–1.3)
GFR AFRICAN AMERICAN: >60
GFR NON-AFRICAN AMERICAN: >60
GLUCOSE BLD-MCNC: 204 MG/DL (ref 70–99)
GLUCOSE BLD-MCNC: 217 MG/DL (ref 70–99)
GLUCOSE BLD-MCNC: 220 MG/DL (ref 70–99)
GLUCOSE BLD-MCNC: 233 MG/DL (ref 70–99)
GLUCOSE BLD-MCNC: 240 MG/DL (ref 70–99)
GLUCOSE BLD-MCNC: 259 MG/DL (ref 70–99)
GLUCOSE BLD-MCNC: 294 MG/DL (ref 70–99)
HCT VFR BLD CALC: 28.6 % (ref 40.5–52.5)
HEMOGLOBIN: 9.1 G/DL (ref 13.5–17.5)
PERFORMED ON: ABNORMAL
PHOSPHORUS: 2.2 MG/DL (ref 2.5–4.9)
POTASSIUM SERPL-SCNC: 4.4 MMOL/L (ref 3.5–5.1)
SODIUM BLD-SCNC: 133 MMOL/L (ref 136–145)

## 2019-08-10 PROCEDURE — 6360000002 HC RX W HCPCS: Performed by: INTERNAL MEDICINE

## 2019-08-10 PROCEDURE — 2580000003 HC RX 258: Performed by: INTERNAL MEDICINE

## 2019-08-10 PROCEDURE — 85014 HEMATOCRIT: CPT

## 2019-08-10 PROCEDURE — 84100 ASSAY OF PHOSPHORUS: CPT

## 2019-08-10 PROCEDURE — 36415 COLL VENOUS BLD VENIPUNCTURE: CPT

## 2019-08-10 PROCEDURE — C9113 INJ PANTOPRAZOLE SODIUM, VIA: HCPCS | Performed by: INTERNAL MEDICINE

## 2019-08-10 PROCEDURE — 85018 HEMOGLOBIN: CPT

## 2019-08-10 PROCEDURE — 6370000000 HC RX 637 (ALT 250 FOR IP): Performed by: INTERNAL MEDICINE

## 2019-08-10 PROCEDURE — 80048 BASIC METABOLIC PNL TOTAL CA: CPT

## 2019-08-10 RX ORDER — DEXTROSE MONOHYDRATE 100 MG/ML
INJECTION, SOLUTION INTRAVENOUS CONTINUOUS
Status: DISCONTINUED | OUTPATIENT
Start: 2019-08-10 | End: 2019-08-10 | Stop reason: HOSPADM

## 2019-08-10 RX ADMIN — INSULIN LISPRO 2 UNITS: 100 INJECTION, SOLUTION INTRAVENOUS; SUBCUTANEOUS at 02:27

## 2019-08-10 RX ADMIN — HYDROMORPHONE HYDROCHLORIDE 0.5 MG: 2 INJECTION, SOLUTION INTRAMUSCULAR; INTRAVENOUS; SUBCUTANEOUS at 11:18

## 2019-08-10 RX ADMIN — HYDROMORPHONE HYDROCHLORIDE 0.5 MG: 2 INJECTION, SOLUTION INTRAMUSCULAR; INTRAVENOUS; SUBCUTANEOUS at 14:40

## 2019-08-10 RX ADMIN — INSULIN LISPRO 3 UNITS: 100 INJECTION, SOLUTION INTRAVENOUS; SUBCUTANEOUS at 11:23

## 2019-08-10 RX ADMIN — INSULIN LISPRO 2 UNITS: 100 INJECTION, SOLUTION INTRAVENOUS; SUBCUTANEOUS at 06:31

## 2019-08-10 RX ADMIN — HYDROMORPHONE HYDROCHLORIDE 0.5 MG: 2 INJECTION, SOLUTION INTRAMUSCULAR; INTRAVENOUS; SUBCUTANEOUS at 02:23

## 2019-08-10 RX ADMIN — HYDROMORPHONE HYDROCHLORIDE 0.5 MG: 2 INJECTION, SOLUTION INTRAMUSCULAR; INTRAVENOUS; SUBCUTANEOUS at 06:39

## 2019-08-10 RX ADMIN — DEXTROSE MONOHYDRATE: 100 INJECTION, SOLUTION INTRAVENOUS at 18:04

## 2019-08-10 RX ADMIN — Medication 10 ML: at 08:42

## 2019-08-10 RX ADMIN — METOPROLOL TARTRATE 25 MG: 25 TABLET ORAL at 08:42

## 2019-08-10 RX ADMIN — PANTOPRAZOLE SODIUM 40 MG: 40 INJECTION, POWDER, LYOPHILIZED, FOR SOLUTION INTRAVENOUS at 08:42

## 2019-08-10 RX ADMIN — HYDROMORPHONE HYDROCHLORIDE 0.5 MG: 2 INJECTION, SOLUTION INTRAMUSCULAR; INTRAVENOUS; SUBCUTANEOUS at 17:56

## 2019-08-10 ASSESSMENT — PAIN DESCRIPTION - PROGRESSION
CLINICAL_PROGRESSION: NOT CHANGED
CLINICAL_PROGRESSION: NOT CHANGED
CLINICAL_PROGRESSION: GRADUALLY WORSENING
CLINICAL_PROGRESSION: NOT CHANGED

## 2019-08-10 ASSESSMENT — PAIN DESCRIPTION - DESCRIPTORS
DESCRIPTORS: DISCOMFORT
DESCRIPTORS: DISCOMFORT;ACHING
DESCRIPTORS: DISCOMFORT
DESCRIPTORS: DISCOMFORT

## 2019-08-10 ASSESSMENT — PAIN DESCRIPTION - ORIENTATION
ORIENTATION: LEFT

## 2019-08-10 ASSESSMENT — PAIN DESCRIPTION - LOCATION
LOCATION: LEG
LOCATION: LEG
LOCATION: HIP
LOCATION: HIP

## 2019-08-10 ASSESSMENT — PAIN DESCRIPTION - ONSET
ONSET: ON-GOING

## 2019-08-10 ASSESSMENT — PAIN DESCRIPTION - PAIN TYPE
TYPE: CHRONIC PAIN
TYPE: CHRONIC PAIN
TYPE: ACUTE PAIN
TYPE: ACUTE PAIN

## 2019-08-10 ASSESSMENT — PAIN DESCRIPTION - FREQUENCY
FREQUENCY: INTERMITTENT
FREQUENCY: CONTINUOUS
FREQUENCY: CONTINUOUS
FREQUENCY: INTERMITTENT

## 2019-08-10 ASSESSMENT — PAIN SCALES - GENERAL
PAINLEVEL_OUTOF10: 4
PAINLEVEL_OUTOF10: 8

## 2019-08-10 ASSESSMENT — PAIN - FUNCTIONAL ASSESSMENT
PAIN_FUNCTIONAL_ASSESSMENT: PREVENTS OR INTERFERES WITH MANY ACTIVE NOT PASSIVE ACTIVITIES
PAIN_FUNCTIONAL_ASSESSMENT: PREVENTS OR INTERFERES SOME ACTIVE ACTIVITIES AND ADLS
PAIN_FUNCTIONAL_ASSESSMENT: ACTIVITIES ARE NOT PREVENTED
PAIN_FUNCTIONAL_ASSESSMENT: PREVENTS OR INTERFERES WITH MANY ACTIVE NOT PASSIVE ACTIVITIES

## 2019-08-10 NOTE — PROGRESS NOTES
IM Progress Note    Admit Date:  7/31/2019  9    Interval history:  Had emesis post initiation of liquid diet  Unable to be transferred to [facility] due to lack of thoracic surgery support     Awaiting bed at [facility] surgery dept  Started on TPN , sugars high       Subjective:  Mr. Bethany Ching seen up in bed, reports ongoing leg cramps   Has been passing flatus        Objective:   BP (!) 151/81   Pulse 108   Temp 98.8 °F (37.1 °C) (Oral)   Resp 20   Ht 5' 8\" (1.727 m)   Wt 117 lb (53.1 kg)   SpO2 96%   BMI 17.79 kg/m²       Intake/Output Summary (Last 24 hours) at 8/9/2019 2048  Last data filed at 8/9/2019 1955  Gross per 24 hour   Intake 2143 ml   Output 2000 ml   Net 143 ml       Physical Exam:      General: eldelrly frail male, up in bed   Awake, alert and oriented. Appears to be not in any distress  Mucous Membranes:  Pink , anicteric  NG in place   Neck: No JVD, no carotid bruit, no thyromegaly  Chest:  Clear to auscultation bilaterally, no added sounds  Cardiovascular:  RRR S1S2 heard, no murmurs or gallops  Abdomen:  Soft, undistended, non tender, no organomegaly, BS ++  Extremities: right Picc noted  No edema or cyanosis.  Distal pulses well felt  Neurological : grossly normal with generalized weakness       Medications:   Scheduled Medications:    insulin lispro  0-12 Units Subcutaneous TID WC    insulin lispro  0-6 Units Subcutaneous Nightly    sodium chloride flush  10 mL Intravenous 2 times per day    fat emulsion  250 mL Intravenous Once per day on Mon Thu    metoprolol tartrate  25 mg Oral BID    pantoprazole  40 mg Intravenous BID     I   Adult Clinimix-E TPN - Central Line (Standard) 60 mL/hr at 08/09/19 1929    dextrose       medicated lip balm, HYDROmorphone, sodium chloride flush, glucose, dextrose, glucagon (rDNA), dextrose, ondansetron, phenol    Lab Data:  Recent Labs     08/07/19  0506 08/08/19  0501 08/09/19  0448   HGB 10.0* 10.0* 9.2*   HCT 31.4* 31.5* 28.5*     Recent Labs

## 2019-08-10 NOTE — CONSULTS
Pharmacy to dose TPN:  Continue previous formulation of E 5/20 with MVI, Trace elements and 22units of Insulin at 60mL/hr. Will add a daily magnesium to labs.     Saroj Boggs PharmD 8/10/2019 11:47 AM
[facility] SURGERY      Department of General Surgery Consult    PATIENT NAME: Manley Sacks OF BIRTH: 1955    ADMISSION DATE: 7/31/2019  3:48 PM      TODAY'S DATE: 8/1/2019    Reason for Consult:  SBO    Requesting Physician:  Ilana Whitten    HISTORY OF PRESENT ILLNESS:              The patient is a 70 y.o. male who presents with weakness and associated N/V for 3 days. He had no alleviating factors at home. Not been able to keep anything down by mouth. Occasional BM.       Past Medical History:        Diagnosis Date    Cancer [facility])     L. kidney Dx June 2017    Diabetes mellitus ([address]     FH: skin cancer     Gall stone     Gastric ulcer     Hyperlipidemia LDL goal < 100     Hypertension     Lung cancer ([address]     Malignant malnutrition ([address] 9/6/2017    Microalbuminuria due to type 2 diabetes mellitus ([address] 10/12/2016    Type 2 diabetes mellitus with hyperglycemia, with long-term current use of insulin ([address] 10/12/2016    Type 2 diabetes mellitus with microalbuminuria, with long-term current use of insulin ([address] 3/10/2017       Past Surgical History:        Procedure Laterality Date    COLONOSCOPY  2010    screening-[facility]    COLONOSCOPY  2016    diverticulosis,transverse colon polyp    ENDOSCOPIC ULTRASOUND (UPPER)  03/21/2018    [facility] LUQ mass    KIDNEY SURGERY      kidney removal    UPPER GASTROINTESTINAL ENDOSCOPY  02/10/2019    UPPER GASTROINTESTINAL ENDOSCOPY N/A 2/10/2019    EGD CONTROL HEMORRHAGE performed by Isabell Obrien MD at [facility] ENDOSCOPY       Current Medications:   Current Facility-Administered Medications: sodium chloride flush 0.9 % injection 10 mL, 10 mL, Intravenous, 2 times per day  sodium chloride flush 0.9 % injection 10 mL, 10 mL, Intravenous, PRN  ondansetron (ZOFRAN) injection 4 mg, 4 mg, Intravenous, Q6H PRN  0.9 % sodium chloride infusion, , Intravenous, Continuous  morphine sulfate (PF) injection 2 mg, 2 mg, Intravenous, Q4H
Problem Relation Age of Onset    Cancer Father         colon, heart disease, stroke    Heart Disease Mother         stroke, diabetes    Diabetes Mother     Cancer Sister         colon, diabetes    Cancer Other         breast (sister)    Other Brother        Review of Systems:   Pertinent positives stated above in HPI. Remainder of 10 point review of systems were reviewed and were negative.     Physical exam:   Constitutional:  VITALS:  BP (!) 108/57   Pulse 106   Temp 98.5 °F (36.9 °C) (Oral)   Resp 20   Ht 5' 8\" (1.727 m)   Wt 113 lb 4.8 oz (51.4 kg)   SpO2 92%   BMI 17.23 kg/m²   Gen: alert, awake, ill-appearing  Skin: no rash, turgor wnl  Heent:  eomi, mmm  Neck: no bruits or jvd noted, thyroid normal  Cardiovascular:  S1, S2 without m/r/g  Respiratory: CTA B without w/r/r; respiratory effort normal  Abdomen:  +bs, soft, nt, nd, no hepatosplenomegaly  Ext: no lower extremity edema  Psychiatric: mood and affect appropriate; judgement and insight intact  Musculoskeletal:  Rom, muscular strength intact; digits, nails normal    Data/  CBC:   Lab Results   Component Value Date    WBC 6.9 07/31/2019    RBC 4.24 07/31/2019    RBC 4.48 07/21/2017    HGB 11.3 07/31/2019    HCT 36.2 07/31/2019    MCV 85.4 07/31/2019    MCH 26.7 07/31/2019    MCHC 31.2 07/31/2019    RDW 17.8 07/31/2019     07/31/2019    MPV 7.5 07/31/2019     BMP:    Lab Results   Component Value Date     08/01/2019    K 4.0 08/01/2019    K 5.0 02/28/2018    CL 97 08/01/2019    CO2 23 08/01/2019    BUN 52 08/01/2019    LABALBU 3.0 08/01/2019    CREATININE 3.4 08/01/2019    CALCIUM 8.7 08/01/2019    GFRAA 22 08/01/2019    GFRAA >60 02/12/2013    LABGLOM 18 08/01/2019    GLUCOSE 90 08/01/2019    GLUCOSE 61 08/15/2016         Assessment/    Acute kidney injury  - Etiology: Pre-renal in setting of SBO, Hypotension  - Data: Peak serum creatinine 5.6 on admission, previously 1.0 from end of June 2019  - Clinical: Improving with IVF's,

## 2019-10-25 NOTE — DISCHARGE SUMMARY
Hospital Medicine Discharge Summary    Patient ID: Susana Streeter      Patient's PCP: Joan Finch MD    Admit Date: 7/31/2019     Discharge Date: 8/10/2019      Admitting Physician: Ashlee Soni MD     Discharge Physician: Chris Christensen MD     Discharge Diagnoses: Active Hospital Problems    Diagnosis    Lung cancer metastatic to bone ([address] [C34.90, C79.51]    S/p nephrectomy [Z90.5]    ALEXUS (acute kidney injury) ([address] [N17.9]    Moderate protein-calorie malnutrition ([address] [E44.0]    SBO (small bowel obstruction) ([address] [K56.609]    History of renal cell cancer [Z85.528]    Severe protein-calorie malnutrition ([address] [E43]    Type 2 diabetes mellitus without complication, with long-term current use of insulin ([address] [E11.9, Z79.4]       The patient was seen and examined on day of discharge and this discharge summary is in conjunction with any daily progress note from day of discharge.     Hospital Course:       #SBO  #Complex left diaphragmatic hernia  - Seen by general surgeon: he has a complex left diaphragmatic hernia,     - He was managed with NG tube placement and NPO status, when he showed improvement his NG tube was clamped and he was started on liquid diet 8/5.  Unfortunately he had further episodes of vomiting after taking liquids, NG tube was again placed to suction    Case was discussed with Dr. Gideon Sesay who recommends transferring to [facility] for evaluation by the complex hernia team.  Unfortunately the CT surgeon will be out of town for the next week and the on call surgeon does not feel comfortable operating on this complex patient without ct surgery support      Called  transfer center and surgery has accepted transfer   I have spoken to pt family regarding transfer and need for further evaluation  Surgery at [facility] will be deciding about further plan of care and he is not a ideal candidate for surgery and may not be doing well with this incident of SBO with stage 4 cancer and recent chemotherapy will hinder any healing process if he ends up with surgery  Family understands     Due to delay in transfer and 1 week into admission with no diet and no progression in SBO, initiated TPN on 8/7 for nutrition        #GI bleed  - NG output appeared dark and bright red blood was noted in commode on 8/4. checked gastric occult which was positive.  Heparin was stopped.   IV PPI added. Pat Huffman was consulted who recommended monitoring.  His h.h remained stable   - of note, he was admitted 2/2019 with Upper GIB- EGD showed severe ulcerative esophagitis with adherent clot s/p hemospray at that time      #ALEXUS  - in setting of SBO, suspected pre-renal cause  - Baseline creatinine 1.0.  Crt was 5.6 on admission.  Seen by nephrology, hydrated with IV fluids.  ACE inhibitor held. Syed Shavon resolved with Crt 0.7     #Hypernatremia  #Status post hyponatremia  - resolved      #Hypertension  - Blood pressure heart rate trending up  - Lisinopril held due to ALEXUS  - added metoprolol- increased dose to 25 mg BID on 8/5/19     #DM2  - Lantus and oral rx held 2/2 NPO   - low-dose sliding scale insulin     #hypokalemia  #hypophosphatemia  - replaced      #Renal Cell Carcinoma, stage 4  - s/p left nephrectomy 2017 at [facility]  - unclear details as patient is poor historian.  Hem/onc notes requested- show that he was started on opdivo 4/2019     #Lumber fracture  - Lytic lesions 2/2 metastases   - PRN pain control       #Debility   - consulted PT,OT- recommending SNF after acute issues resolve       #moderate protein calorie malnutrition  - nutrition consulted    Physical Exam Performed:     BP (!) 145/85   Pulse 108   Temp 98.3 °F (36.8 °C) (Oral)   Resp 20   Ht 5' 8\" (1.727 m)   Wt 117 lb (53.1 kg)   SpO2 98%   BMI 17.79 kg/m²     General: eldelrly frail male, up in bed   Awake, alert and oriented.  Appears to be not in any distress  Mucous Membranes:  Pink , anicteric  NG in place   Neck: No JVD, no carotid bruit, no

## 2024-02-21 NOTE — PROGRESS NOTES
Detail Level: Generalized Patient given pain medication for pain 4 out of 10. Will continue to monitor.  Jayashree Sabillon RN Detail Level: Detailed Quality 137: Melanoma: Continuity Of Care - Recall System: Patient information entered into a recall system that includes: target date for the next exam specified AND a process to follow up with patients regarding missed or unscheduled appointments Sunscreen Recommendations: Discussed cosmetic removal pricing if pt opts to have shave removal. When Should The Patient Follow-Up For Their Next Full-Body Skin Exam?: 1 Year Detail Level: Zone

## 2025-04-24 NOTE — PROGRESS NOTES
8/6/19 3506 updated Dr. Walter Harley to inform of what the  bed transfer center had said in regards to bed.  Hernando Pham MT/GURINDER Called patient to discuss ultrasound report. Per Dr. Alvares, starting tonight, intercourse every other day. No answer. LM to return my call.

## (undated) DEVICE — HEMOSPRAY ENDOSCOPIC HEMOSTAT: Brand: HEMOSPRAY